# Patient Record
Sex: FEMALE | Race: WHITE | NOT HISPANIC OR LATINO | URBAN - METROPOLITAN AREA
[De-identification: names, ages, dates, MRNs, and addresses within clinical notes are randomized per-mention and may not be internally consistent; named-entity substitution may affect disease eponyms.]

---

## 2021-01-06 ENCOUNTER — OUTPATIENT (OUTPATIENT)
Dept: OUTPATIENT SERVICES | Facility: HOSPITAL | Age: 66
LOS: 1 days | Discharge: HOME | End: 2021-01-06
Payer: MEDICAID

## 2021-01-06 DIAGNOSIS — N18.32 CHRONIC KIDNEY DISEASE, STAGE 3B: ICD-10-CM

## 2021-01-06 PROCEDURE — 76775 US EXAM ABDO BACK WALL LIM: CPT | Mod: 26

## 2023-02-21 ENCOUNTER — INPATIENT (INPATIENT)
Facility: HOSPITAL | Age: 68
LOS: 2 days | Discharge: HOME | DRG: 41 | End: 2023-02-24
Attending: HOSPITALIST | Admitting: HOSPITALIST
Payer: MEDICARE

## 2023-02-21 VITALS
OXYGEN SATURATION: 96 % | HEART RATE: 98 BPM | RESPIRATION RATE: 20 BRPM | DIASTOLIC BLOOD PRESSURE: 79 MMHG | TEMPERATURE: 97 F | SYSTOLIC BLOOD PRESSURE: 169 MMHG

## 2023-02-21 DIAGNOSIS — H34.12 CENTRAL RETINAL ARTERY OCCLUSION, LEFT EYE: ICD-10-CM

## 2023-02-21 LAB
ANION GAP SERPL CALC-SCNC: 15 MMOL/L — HIGH (ref 7–14)
BASOPHILS # BLD AUTO: 0.09 K/UL — SIGNIFICANT CHANGE UP (ref 0–0.2)
BASOPHILS NFR BLD AUTO: 0.6 % — SIGNIFICANT CHANGE UP (ref 0–1)
BUN SERPL-MCNC: 32 MG/DL — HIGH (ref 10–20)
CALCIUM SERPL-MCNC: 9.9 MG/DL — SIGNIFICANT CHANGE UP (ref 8.4–10.5)
CHLORIDE SERPL-SCNC: 104 MMOL/L — SIGNIFICANT CHANGE UP (ref 98–110)
CO2 SERPL-SCNC: 20 MMOL/L — SIGNIFICANT CHANGE UP (ref 17–32)
CREAT SERPL-MCNC: 1.5 MG/DL — SIGNIFICANT CHANGE UP (ref 0.7–1.5)
CRP SERPL-MCNC: 18.5 MG/L — HIGH
EGFR: 38 ML/MIN/1.73M2 — LOW
EOSINOPHIL # BLD AUTO: 0.13 K/UL — SIGNIFICANT CHANGE UP (ref 0–0.7)
EOSINOPHIL NFR BLD AUTO: 0.8 % — SIGNIFICANT CHANGE UP (ref 0–8)
ERYTHROCYTE [SEDIMENTATION RATE] IN BLOOD: 45 MM/HR — HIGH (ref 0–20)
GLUCOSE BLDC GLUCOMTR-MCNC: 131 MG/DL — HIGH (ref 70–99)
GLUCOSE BLDC GLUCOMTR-MCNC: 131 MG/DL — HIGH (ref 70–99)
GLUCOSE SERPL-MCNC: 131 MG/DL — HIGH (ref 70–99)
HCT VFR BLD CALC: 46.4 % — SIGNIFICANT CHANGE UP (ref 37–47)
HGB BLD-MCNC: 13.8 G/DL — SIGNIFICANT CHANGE UP (ref 12–16)
IMM GRANULOCYTES NFR BLD AUTO: 0.6 % — HIGH (ref 0.1–0.3)
LYMPHOCYTES # BLD AUTO: 14.7 % — LOW (ref 20.5–51.1)
LYMPHOCYTES # BLD AUTO: 2.4 K/UL — SIGNIFICANT CHANGE UP (ref 1.2–3.4)
MCHC RBC-ENTMCNC: 23.9 PG — LOW (ref 27–31)
MCHC RBC-ENTMCNC: 29.7 G/DL — LOW (ref 32–37)
MCV RBC AUTO: 80.3 FL — LOW (ref 81–99)
MONOCYTES # BLD AUTO: 0.64 K/UL — HIGH (ref 0.1–0.6)
MONOCYTES NFR BLD AUTO: 3.9 % — SIGNIFICANT CHANGE UP (ref 1.7–9.3)
NEUTROPHILS # BLD AUTO: 12.92 K/UL — HIGH (ref 1.4–6.5)
NEUTROPHILS NFR BLD AUTO: 79.4 % — HIGH (ref 42.2–75.2)
NRBC # BLD: 0 /100 WBCS — SIGNIFICANT CHANGE UP (ref 0–0)
PLATELET # BLD AUTO: 387 K/UL — SIGNIFICANT CHANGE UP (ref 130–400)
POTASSIUM SERPL-MCNC: 4.5 MMOL/L — SIGNIFICANT CHANGE UP (ref 3.5–5)
POTASSIUM SERPL-SCNC: 4.5 MMOL/L — SIGNIFICANT CHANGE UP (ref 3.5–5)
RBC # BLD: 5.78 M/UL — HIGH (ref 4.2–5.4)
RBC # FLD: 15.9 % — HIGH (ref 11.5–14.5)
SARS-COV-2 RNA SPEC QL NAA+PROBE: SIGNIFICANT CHANGE UP
SODIUM SERPL-SCNC: 139 MMOL/L — SIGNIFICANT CHANGE UP (ref 135–146)
WBC # BLD: 16.28 K/UL — HIGH (ref 4.8–10.8)
WBC # FLD AUTO: 16.28 K/UL — HIGH (ref 4.8–10.8)

## 2023-02-21 PROCEDURE — 93312 ECHO TRANSESOPHAGEAL: CPT

## 2023-02-21 PROCEDURE — 70540 MRI ORBIT/FACE/NECK W/O DYE: CPT

## 2023-02-21 PROCEDURE — 81001 URINALYSIS AUTO W/SCOPE: CPT

## 2023-02-21 PROCEDURE — 83735 ASSAY OF MAGNESIUM: CPT

## 2023-02-21 PROCEDURE — 86803 HEPATITIS C AB TEST: CPT

## 2023-02-21 PROCEDURE — 83036 HEMOGLOBIN GLYCOSYLATED A1C: CPT

## 2023-02-21 PROCEDURE — 33285 INSJ SUBQ CAR RHYTHM MNTR: CPT

## 2023-02-21 PROCEDURE — 80053 COMPREHEN METABOLIC PANEL: CPT

## 2023-02-21 PROCEDURE — 93325 DOPPLER ECHO COLOR FLOW MAPG: CPT

## 2023-02-21 PROCEDURE — 99223 1ST HOSP IP/OBS HIGH 75: CPT

## 2023-02-21 PROCEDURE — 82962 GLUCOSE BLOOD TEST: CPT

## 2023-02-21 PROCEDURE — 85025 COMPLETE CBC W/AUTO DIFF WBC: CPT

## 2023-02-21 PROCEDURE — 70498 CT ANGIOGRAPHY NECK: CPT | Mod: 26,MA

## 2023-02-21 PROCEDURE — 93320 DOPPLER ECHO COMPLETE: CPT

## 2023-02-21 PROCEDURE — 93880 EXTRACRANIAL BILAT STUDY: CPT

## 2023-02-21 PROCEDURE — 93970 EXTREMITY STUDY: CPT

## 2023-02-21 PROCEDURE — 93010 ELECTROCARDIOGRAM REPORT: CPT

## 2023-02-21 PROCEDURE — 70496 CT ANGIOGRAPHY HEAD: CPT | Mod: 26,MA

## 2023-02-21 PROCEDURE — 93005 ELECTROCARDIOGRAM TRACING: CPT

## 2023-02-21 PROCEDURE — 93306 TTE W/DOPPLER COMPLETE: CPT

## 2023-02-21 PROCEDURE — 70551 MRI BRAIN STEM W/O DYE: CPT

## 2023-02-21 PROCEDURE — 70450 CT HEAD/BRAIN W/O DYE: CPT | Mod: 26,MA,59

## 2023-02-21 PROCEDURE — 36415 COLL VENOUS BLD VENIPUNCTURE: CPT

## 2023-02-21 PROCEDURE — C1764: CPT

## 2023-02-21 RX ORDER — EMPAGLIFLOZIN 10 MG/1
1 TABLET, FILM COATED ORAL
Qty: 0 | Refills: 0 | DISCHARGE

## 2023-02-21 RX ORDER — GABAPENTIN 400 MG/1
300 CAPSULE ORAL THREE TIMES A DAY
Refills: 0 | Status: DISCONTINUED | OUTPATIENT
Start: 2023-02-21 | End: 2023-02-24

## 2023-02-21 RX ORDER — INSULIN GLARGINE 100 [IU]/ML
38 INJECTION, SOLUTION SUBCUTANEOUS AT BEDTIME
Refills: 0 | Status: DISCONTINUED | OUTPATIENT
Start: 2023-02-21 | End: 2023-02-24

## 2023-02-21 RX ORDER — HEPARIN SODIUM 5000 [USP'U]/ML
5000 INJECTION INTRAVENOUS; SUBCUTANEOUS EVERY 12 HOURS
Refills: 0 | Status: DISCONTINUED | OUTPATIENT
Start: 2023-02-21 | End: 2023-02-24

## 2023-02-21 RX ORDER — DEXTROSE 50 % IN WATER 50 %
25 SYRINGE (ML) INTRAVENOUS ONCE
Refills: 0 | Status: DISCONTINUED | OUTPATIENT
Start: 2023-02-21 | End: 2023-02-24

## 2023-02-21 RX ORDER — SPIRONOLACTONE 25 MG/1
0.5 TABLET, FILM COATED ORAL
Qty: 0 | Refills: 0 | DISCHARGE

## 2023-02-21 RX ORDER — SODIUM CHLORIDE 9 MG/ML
1000 INJECTION, SOLUTION INTRAVENOUS
Refills: 0 | Status: DISCONTINUED | OUTPATIENT
Start: 2023-02-21 | End: 2023-02-24

## 2023-02-21 RX ORDER — GLUCAGON INJECTION, SOLUTION 0.5 MG/.1ML
1 INJECTION, SOLUTION SUBCUTANEOUS ONCE
Refills: 0 | Status: DISCONTINUED | OUTPATIENT
Start: 2023-02-21 | End: 2023-02-24

## 2023-02-21 RX ORDER — LOSARTAN POTASSIUM 100 MG/1
1 TABLET, FILM COATED ORAL
Qty: 0 | Refills: 0 | DISCHARGE

## 2023-02-21 RX ORDER — CLOPIDOGREL BISULFATE 75 MG/1
75 TABLET, FILM COATED ORAL ONCE
Refills: 0 | Status: COMPLETED | OUTPATIENT
Start: 2023-02-21 | End: 2023-02-21

## 2023-02-21 RX ORDER — ASPIRIN/CALCIUM CARB/MAGNESIUM 324 MG
81 TABLET ORAL DAILY
Refills: 0 | Status: DISCONTINUED | OUTPATIENT
Start: 2023-02-21 | End: 2023-02-24

## 2023-02-21 RX ORDER — SPIRONOLACTONE 25 MG/1
12.5 TABLET, FILM COATED ORAL DAILY
Refills: 0 | Status: DISCONTINUED | OUTPATIENT
Start: 2023-02-21 | End: 2023-02-24

## 2023-02-21 RX ORDER — LOSARTAN POTASSIUM 100 MG/1
50 TABLET, FILM COATED ORAL DAILY
Refills: 0 | Status: DISCONTINUED | OUTPATIENT
Start: 2023-02-21 | End: 2023-02-24

## 2023-02-21 RX ORDER — AMLODIPINE BESYLATE 2.5 MG/1
5 TABLET ORAL DAILY
Refills: 0 | Status: DISCONTINUED | OUTPATIENT
Start: 2023-02-21 | End: 2023-02-24

## 2023-02-21 RX ORDER — DEXTROSE 50 % IN WATER 50 %
15 SYRINGE (ML) INTRAVENOUS ONCE
Refills: 0 | Status: DISCONTINUED | OUTPATIENT
Start: 2023-02-21 | End: 2023-02-24

## 2023-02-21 RX ORDER — INSULIN LISPRO 100/ML
VIAL (ML) SUBCUTANEOUS
Refills: 0 | Status: DISCONTINUED | OUTPATIENT
Start: 2023-02-21 | End: 2023-02-24

## 2023-02-21 RX ORDER — ATORVASTATIN CALCIUM 80 MG/1
20 TABLET, FILM COATED ORAL AT BEDTIME
Refills: 0 | Status: DISCONTINUED | OUTPATIENT
Start: 2023-02-21 | End: 2023-02-24

## 2023-02-21 RX ORDER — AMLODIPINE BESYLATE 2.5 MG/1
1 TABLET ORAL
Qty: 0 | Refills: 0 | DISCHARGE

## 2023-02-21 RX ORDER — LINAGLIPTIN 5 MG/1
1 TABLET, FILM COATED ORAL
Qty: 0 | Refills: 0 | DISCHARGE

## 2023-02-21 RX ORDER — INSULIN LISPRO 100/ML
8 VIAL (ML) SUBCUTANEOUS
Refills: 0 | Status: DISCONTINUED | OUTPATIENT
Start: 2023-02-21 | End: 2023-02-24

## 2023-02-21 RX ORDER — DEXTROSE 50 % IN WATER 50 %
12.5 SYRINGE (ML) INTRAVENOUS ONCE
Refills: 0 | Status: DISCONTINUED | OUTPATIENT
Start: 2023-02-21 | End: 2023-02-24

## 2023-02-21 RX ORDER — GABAPENTIN 400 MG/1
1 CAPSULE ORAL
Qty: 0 | Refills: 0 | DISCHARGE

## 2023-02-21 RX ADMIN — GABAPENTIN 300 MILLIGRAM(S): 400 CAPSULE ORAL at 21:34

## 2023-02-21 RX ADMIN — INSULIN GLARGINE 38 UNIT(S): 100 INJECTION, SOLUTION SUBCUTANEOUS at 21:34

## 2023-02-21 RX ADMIN — CLOPIDOGREL BISULFATE 75 MILLIGRAM(S): 75 TABLET, FILM COATED ORAL at 16:39

## 2023-02-21 RX ADMIN — ATORVASTATIN CALCIUM 20 MILLIGRAM(S): 80 TABLET, FILM COATED ORAL at 21:34

## 2023-02-21 NOTE — ED PROVIDER NOTE - ATTENDING APP SHARED VISIT CONTRIBUTION OF CARE
68-year-old woman, history of hypertension, diabetes, hyperlipidemia, chronic kidney disease, sent in by her ophthalmologist for evaluation after she presented to his office with 6 days of intermittent left eye blurriness.  She reports that the left eye has been intermittently "easy" but as she has a baseline of diabetic retinopathy and macular edema, she did not think much of it.  She made an appointment with her doctor this morning, who dilated her eyes and is suspicious for left-sided central retinal artery occlusion.  Patient denies headache, nausea, vomiting, or other focal symptoms.  On exam she is alert and nontoxic appearing.  Attempted for endoscopy, however, could not clearly delineate retinal pallor.  We will begin stroke work-up, consult neuro, reassess.

## 2023-02-21 NOTE — ED PROVIDER NOTE - OBJECTIVE STATEMENT
Pt with DM, HTN, HLD, CKD presents with left eye intermittent blurred vision x 4 days. Seen by her eye doc today and sent to ED for concern of retinal artery occlusion. Denies vision loss or pain. just blurred. Denies weakness, numbness, HA, slurred speech.

## 2023-02-21 NOTE — ED PROVIDER NOTE - PHYSICAL EXAMINATION
CONST: Well appearing in NAD  EYES: pupils dilated. EOMI. VA with correction is 20/20 OD and 20/200 OS. Corneas clear  ENT: Oropharynx normal appearing, no erythema or exudates. Uvula midline.  NECK: Non-tender, no meningeal signs  CARD: Normal S1 S2; Normal rate and rhythm  RESP: Equal BS B/L, No wheezes, rhonchi or rales. No distress  MS: Normal ROM in all extremities. No midline spinal tenderness.  SKIN: Warm, dry, no acute rashes. Good turgor  NEURO: A&Ox3, No focal deficits. Strength 5/5 with no sensory deficits. Steady gait

## 2023-02-21 NOTE — ED ADULT NURSE NOTE - OBJECTIVE STATEMENT
pt presents to the ED from ophthalmologist for L eye eval. pt states she lost vision in her left eye on sat. pt denies n/v, dizziness

## 2023-02-21 NOTE — ED PROVIDER NOTE - CLINICAL SUMMARY MEDICAL DECISION MAKING FREE TEXT BOX
Labs ok. CT head with chronic lacunes, no acute infarct noted, CTA head/neck with scattered atherosclerotic disease but no significant narrowing of vessels. Spoke with neuro, as pt still symptomatic, not a candidate for CDU, will admit medicine for completion of stroke workup.

## 2023-02-21 NOTE — PATIENT PROFILE ADULT - FALL HARM RISK - RISK INTERVENTIONS

## 2023-02-21 NOTE — H&P ADULT - HISTORY OF PRESENT ILLNESS
Pt is a 67 y/o F with DM, HTN, HLD, CKD, chronic diabetic retinopathy of both eyes, who presents with left eye blurred vision x 5 days. Last thursday, pt was home and in the evening she had acute onset blurred vision in her left eye, she decided to go to sleep. The next morning when she woke up the blurred vision was gone but a few hours later it resumed and has stayed blurry ever since. The blurriness is at its worse in the middle/central field of vision, she can make out outlines but cannot read or really tell colors. The right eye is not affected. There is NO pain whatsoever. Pt went to her opthomatologist this morning, he did an exam and was concerned for central retinal artery occlusion so he sent her to the ED for imaging. Denies weakness, numbness, HA, slurred speech.    In the ED, pt given a dose of plavix 75  Vitals: Vital Signs Last 24 Hrs  T(C): 36.1 (21 Feb 2023 13:29), Max: 36.1 (21 Feb 2023 11:48)  T(F): 97 (21 Feb 2023 13:29), Max: 97 (21 Feb 2023 11:48)  HR: 95 (21 Feb 2023 13:29) (95 - 98)  BP: 142/72 (21 Feb 2023 13:29) (142/72 - 169/79)  BP(mean): --  RR: 20 (21 Feb 2023 13:29) (20 - 20)  SpO2: 96% (21 Feb 2023 13:29) (96% - 96%)    Parameters below as of 21 Feb 2023 13:29  Patient On (Oxygen Delivery Method): room air    Labs: remarkable for wbc 16k, crp 18.5  Imaging:  < from: CT Angio Head w/ IV Cont (02.21.23 @ 15:04) >  IMPRESSION:    1.  No evidence of major vascular stenosis or occlusion.    2.  Scattered mild atheromatous changes as above.    < end of copied text >  < from: CT Head No Cont (02.21.23 @ 15:04) >  IMPRESSION:    1.  No evidence of acute intracranial hemorrhage or large territory   infarct.    2.  Lacunar infarcts in the right basal ganglia and corona radiata of   indeterminate age.    < end of copied text >

## 2023-02-21 NOTE — H&P ADULT - ASSESSMENT
Pt is a 67 y/o F with DM, HTN, HLD, CKD, chronic diabetic retinopathy of both eyes, who presents with left eye blurred vision x 5 days.    #Suspect Left central artery occlusion  -continuous PAINLESS blurry vision in left eye for 5 days  -at this point pt is out of timeline of any potential emergent intervention, though data and studies are anyways unclear about acute treatment for CRAO  -CTH: no acute path, indeterminate lacunar infarcts  -CTA H+N: No evidence of major vascular stenosis or occlusion.Scattered mild atheromatous changes as above.  -neuro eval  -Optho eval  -ECG  -TTE    #Leukocytosis  -wbc 16k, no other sirs   -CRP 18.5  -ESR pending  -soemthing to consider in monocular vision loss is GCA which may explain wbc and crp though pt has absolutely no pain both in eye or temporal area  -hold abx for now and monitor. Can consider steroids/rheum eval    #HTN  #HLD  -c/w losartan 50  -c/w amlodipine 5  -c/w spirinolaxone 12.5  -c/w lipitor 20    #DM   #diabetic neuropathy  -takes tradjenta, novolog, basaglar, jardiance at home  -c/w home dose lantus 38 units, admelog 8u TID, ISS  -monitor and adjust   -c/w gabapentin 300 tid    #Anxiety  -c/w buspirone 7.5    DVT ppx: heparin  Diet: dash  Dispo: from home    Pt is a 69 y/o F with DM, HTN, HLD, CKD, chronic diabetic retinopathy of both eyes, who presents with left eye blurred vision x 5 days.    #Suspect Left central retinal artery occlusion  -continuous PAINLESS blurry vision in left eye for 5 days  -at this point pt is out of timeline of any potential emergent intervention, though data and studies are anyways unclear about acute treatment for CRAO  -CTH: no acute path, indeterminate lacunar infarcts  -CTA H+N: No evidence of major vascular stenosis or occlusion.Scattered mild atheromatous changes as above.  -neuro eval  -Optho eval  -ECG  -TTE    #Leukocytosis  -wbc 16k, no other sirs   -CRP 18.5  -ESR pending  -soemthing to consider in monocular vision loss is GCA which may explain wbc and crp though pt has absolutely no pain both in eye or temporal area  -hold abx for now and monitor. Can consider steroids/rheum eval    #HTN  #HLD  -c/w losartan 50  -c/w amlodipine 5  -c/w spirinolaxone 12.5  -c/w lipitor 20    #DM   #diabetic neuropathy  -takes tradjenta, novolog, basaglar, jardiance at home  -c/w home dose lantus 38 units, admelog 8u TID, ISS  -monitor and adjust   -c/w gabapentin 300 tid    #Anxiety  -c/w buspirone 7.5    DVT ppx: heparin  Diet: dash  Dispo: from home    Pt is a 67 y/o F with DM, HTN, HLD, CKD, chronic diabetic retinopathy of both eyes, who presents with left eye blurred vision x 5 days.    #Suspect Left central retinal artery occlusion  -continuous PAINLESS blurry vision in left eye for 5 days  -Saw her ophthalmologist this morning and he was concerned for CRAO based on his eye exam. He did not do any intervention and sent her to ed for stroke w/u   -at this point pt is out of timeline of any potential emergent intervention, though data and studies are anyways unclear about acute treatment for CRAO  -CTH: no acute path, indeterminate lacunar infarcts  -CTA H+N: No evidence of major vascular stenosis or occlusion.Scattered mild atheromatous changes as above.  -neuro eval  -Optho eval  -ECG  -TTE    #Leukocytosis  -wbc 16k, no other sirs   -CRP 18.5  -ESR pending  -something to consider in monocular vision loss is GCA which may explain wbc and crp though pt has absolutely no pain both in eye or temporal area, no headaches, jaw claudication, fever/sick symptoms, or signs of vacular abnormalities. Therefore, pt does not meet criteria for steroids at this time.   -hold abx for now and monitor. Can consider steroids/rheum eval    #HTN  #HLD  -c/w losartan 50  -c/w amlodipine 5  -c/w spirinolaxone 12.5  -c/w lipitor 20    #DM   #diabetic neuropathy  -takes tradjenta, novolog, basaglar, jardiance at home  -c/w home dose lantus 38 units, admelog 8u TID, ISS  -monitor and adjust   -c/w gabapentin 300 tid    #Anxiety  -c/w buspirone 7.5    DVT ppx: heparin  Diet: dash  Dispo: from home

## 2023-02-21 NOTE — ED PROVIDER NOTE - NS ED ATTENDING STATEMENT MOD
This was a shared visit with the LINN. I reviewed and verified the documentation and independently performed the documented:

## 2023-02-21 NOTE — ED ADULT NURSE NOTE - NS ED NURSE RECORD ANOTHER VITAL SIGN
Yes GOAL: Pt will improve b/l  (UE/LE) strength by at least 1/2 MMT grade within 2-3 weeks to assist with performing functional mobility and ADLs. GOAL: Pt will improve b/l  (UE/LE) strength by at least 1/2 MMT grade within 1-3 days to assist with performing functional mobility and ADLs.

## 2023-02-21 NOTE — H&P ADULT - ATTENDING COMMENTS
67 YO F w/ a PMH of DM2, HTN, HLD, CKD3, and chronic diabetic retinopathy of both eyes who presents to the hospital w/ a c/o left-sided vision loss that occurred suddenly x 5 days ago. Described as sudden painless, and turned into left-sided blurry vision over the next several days. Denies any pain, temporal pain, or fevers/chills. No palpitations or CP. ROS is negative except as above.     In the ED, CTH/CTA-head/neck showed old lacunar infarcts. Neuro consulted and recommended MRI, Echo, ASA/statin, and plavix.     FMHx:   -No family Hx of early cardiac death, CAD, asthma, or genetic disorders identified    Physical exam shows pt in NAD. VSS, afebrile, not hypoxic on RA. A&Ox3. Neuro exam without deficits, motor/sensory intact, no dysarthria, no facial asymmetry. Muscle strength/sensation intact. CTA B/L with no W/C/R. RRR, no M/G/R. ABD is soft and non-tender, normoactive BSs. LEs without swelling. No rashes. Labs and radiology as above.     Left-sided painless vision loss, suspect CRAO vs CVA. Neurology is following case. A1c and lipids. TSH, B12, and Folate. MRI. Echo. Secondary stroke PPX (ASA/Statin). Neurochecks. PT/OT/speech consult. Fall precautions.   -Optho  -Agree with ESR/CRP to rule out Temporal arteritis    Hypertensive urgency. Restart home meds. Monitor VSs.     Hx of DM2, HLD, CKD3, and chronic diabetic retinopathy of both eyes. Restart home meds, except as stated above. DVT PPX. Inform PCP of pt's admission to hospital. My note supersedes the residents note.     Date seen by Attendin23

## 2023-02-21 NOTE — H&P ADULT - NSHPPHYSICALEXAM_GEN_ALL_CORE
GENERAL: NAD, lying in bed comfortably  HEAD:  Atraumatic, Normocephalic  EYES: left eye blurry vision, cannot read or tell colors, EOMI, r eye with full baseline vision  ENT: Moist mucous membranes  CHEST/LUNG: Clear to auscultation bilaterally; No rales, rhonchi, wheezing, or rubs. Unlabored respirations  HEART: Regular rate and rhythm; No murmurs, rubs, or gallops  ABDOMEN: Soft, nontender, nondistended  EXTREMITIES:  No clubbing, cyanosis, or edema  NERVOUS SYSTEM:  A&Ox3

## 2023-02-21 NOTE — H&P ADULT - NSHPLABSRESULTS_GEN_ALL_CORE
13.8   16.28 )-----------( 387      ( 21 Feb 2023 14:42 )             46.4       02-21    139  |  104  |  32<H>  ----------------------------<  131<H>  4.5   |  20  |  1.5    Ca    9.9      21 Feb 2023 14:42

## 2023-02-22 LAB
A1C WITH ESTIMATED AVERAGE GLUCOSE RESULT: 7.1 % — HIGH (ref 4–5.6)
ALBUMIN SERPL ELPH-MCNC: 3.9 G/DL — SIGNIFICANT CHANGE UP (ref 3.5–5.2)
ALP SERPL-CCNC: 108 U/L — SIGNIFICANT CHANGE UP (ref 30–115)
ALT FLD-CCNC: 11 U/L — SIGNIFICANT CHANGE UP (ref 0–41)
ANION GAP SERPL CALC-SCNC: 13 MMOL/L — SIGNIFICANT CHANGE UP (ref 7–14)
AST SERPL-CCNC: 13 U/L — SIGNIFICANT CHANGE UP (ref 0–41)
BASOPHILS # BLD AUTO: 0.08 K/UL — SIGNIFICANT CHANGE UP (ref 0–0.2)
BASOPHILS NFR BLD AUTO: 0.6 % — SIGNIFICANT CHANGE UP (ref 0–1)
BILIRUB SERPL-MCNC: 0.4 MG/DL — SIGNIFICANT CHANGE UP (ref 0.2–1.2)
BUN SERPL-MCNC: 31 MG/DL — HIGH (ref 10–20)
CALCIUM SERPL-MCNC: 9.6 MG/DL — SIGNIFICANT CHANGE UP (ref 8.4–10.5)
CHLORIDE SERPL-SCNC: 104 MMOL/L — SIGNIFICANT CHANGE UP (ref 98–110)
CO2 SERPL-SCNC: 22 MMOL/L — SIGNIFICANT CHANGE UP (ref 17–32)
CREAT SERPL-MCNC: 1.4 MG/DL — SIGNIFICANT CHANGE UP (ref 0.7–1.5)
EGFR: 41 ML/MIN/1.73M2 — LOW
EOSINOPHIL # BLD AUTO: 0.23 K/UL — SIGNIFICANT CHANGE UP (ref 0–0.7)
EOSINOPHIL NFR BLD AUTO: 1.7 % — SIGNIFICANT CHANGE UP (ref 0–8)
ESTIMATED AVERAGE GLUCOSE: 157 MG/DL — HIGH (ref 68–114)
GLUCOSE BLDC GLUCOMTR-MCNC: 109 MG/DL — HIGH (ref 70–99)
GLUCOSE BLDC GLUCOMTR-MCNC: 129 MG/DL — HIGH (ref 70–99)
GLUCOSE BLDC GLUCOMTR-MCNC: 133 MG/DL — HIGH (ref 70–99)
GLUCOSE BLDC GLUCOMTR-MCNC: 152 MG/DL — HIGH (ref 70–99)
GLUCOSE SERPL-MCNC: 143 MG/DL — HIGH (ref 70–99)
HCT VFR BLD CALC: 40.2 % — SIGNIFICANT CHANGE UP (ref 37–47)
HCV AB S/CO SERPL IA: 0.04 COI — SIGNIFICANT CHANGE UP
HCV AB SERPL-IMP: SIGNIFICANT CHANGE UP
HGB BLD-MCNC: 12.8 G/DL — SIGNIFICANT CHANGE UP (ref 12–16)
IMM GRANULOCYTES NFR BLD AUTO: 0.4 % — HIGH (ref 0.1–0.3)
LYMPHOCYTES # BLD AUTO: 1.96 K/UL — SIGNIFICANT CHANGE UP (ref 1.2–3.4)
LYMPHOCYTES # BLD AUTO: 14.5 % — LOW (ref 20.5–51.1)
MAGNESIUM SERPL-MCNC: 1.9 MG/DL — SIGNIFICANT CHANGE UP (ref 1.8–2.4)
MCHC RBC-ENTMCNC: 25.2 PG — LOW (ref 27–31)
MCHC RBC-ENTMCNC: 31.8 G/DL — LOW (ref 32–37)
MCV RBC AUTO: 79.3 FL — LOW (ref 81–99)
MONOCYTES # BLD AUTO: 0.64 K/UL — HIGH (ref 0.1–0.6)
MONOCYTES NFR BLD AUTO: 4.7 % — SIGNIFICANT CHANGE UP (ref 1.7–9.3)
NEUTROPHILS # BLD AUTO: 10.56 K/UL — HIGH (ref 1.4–6.5)
NEUTROPHILS NFR BLD AUTO: 78.1 % — HIGH (ref 42.2–75.2)
NRBC # BLD: 0 /100 WBCS — SIGNIFICANT CHANGE UP (ref 0–0)
PLATELET # BLD AUTO: 309 K/UL — SIGNIFICANT CHANGE UP (ref 130–400)
POTASSIUM SERPL-MCNC: 4.6 MMOL/L — SIGNIFICANT CHANGE UP (ref 3.5–5)
POTASSIUM SERPL-SCNC: 4.6 MMOL/L — SIGNIFICANT CHANGE UP (ref 3.5–5)
PROT SERPL-MCNC: 7 G/DL — SIGNIFICANT CHANGE UP (ref 6–8)
RBC # BLD: 5.07 M/UL — SIGNIFICANT CHANGE UP (ref 4.2–5.4)
RBC # FLD: 15.9 % — HIGH (ref 11.5–14.5)
SODIUM SERPL-SCNC: 139 MMOL/L — SIGNIFICANT CHANGE UP (ref 135–146)
WBC # BLD: 13.53 K/UL — HIGH (ref 4.8–10.8)
WBC # FLD AUTO: 13.53 K/UL — HIGH (ref 4.8–10.8)

## 2023-02-22 PROCEDURE — 93880 EXTRACRANIAL BILAT STUDY: CPT | Mod: 26

## 2023-02-22 PROCEDURE — 99232 SBSQ HOSP IP/OBS MODERATE 35: CPT

## 2023-02-22 PROCEDURE — 93306 TTE W/DOPPLER COMPLETE: CPT | Mod: 26

## 2023-02-22 PROCEDURE — 99221 1ST HOSP IP/OBS SF/LOW 40: CPT

## 2023-02-22 PROCEDURE — 70540 MRI ORBIT/FACE/NECK W/O DYE: CPT | Mod: 26

## 2023-02-22 PROCEDURE — 70551 MRI BRAIN STEM W/O DYE: CPT | Mod: 26

## 2023-02-22 RX ORDER — CLOPIDOGREL BISULFATE 75 MG/1
75 TABLET, FILM COATED ORAL DAILY
Refills: 0 | Status: DISCONTINUED | OUTPATIENT
Start: 2023-02-22 | End: 2023-02-24

## 2023-02-22 RX ADMIN — GABAPENTIN 300 MILLIGRAM(S): 400 CAPSULE ORAL at 05:33

## 2023-02-22 RX ADMIN — AMLODIPINE BESYLATE 5 MILLIGRAM(S): 2.5 TABLET ORAL at 05:33

## 2023-02-22 RX ADMIN — ATORVASTATIN CALCIUM 20 MILLIGRAM(S): 80 TABLET, FILM COATED ORAL at 21:47

## 2023-02-22 RX ADMIN — INSULIN GLARGINE 38 UNIT(S): 100 INJECTION, SOLUTION SUBCUTANEOUS at 21:46

## 2023-02-22 RX ADMIN — Medication 8 UNIT(S): at 08:19

## 2023-02-22 RX ADMIN — Medication 8 UNIT(S): at 11:46

## 2023-02-22 RX ADMIN — Medication 7.5 MILLIGRAM(S): at 17:08

## 2023-02-22 RX ADMIN — Medication 8 UNIT(S): at 17:09

## 2023-02-22 RX ADMIN — LOSARTAN POTASSIUM 50 MILLIGRAM(S): 100 TABLET, FILM COATED ORAL at 05:33

## 2023-02-22 RX ADMIN — Medication 81 MILLIGRAM(S): at 11:46

## 2023-02-22 RX ADMIN — HEPARIN SODIUM 5000 UNIT(S): 5000 INJECTION INTRAVENOUS; SUBCUTANEOUS at 17:08

## 2023-02-22 RX ADMIN — HEPARIN SODIUM 5000 UNIT(S): 5000 INJECTION INTRAVENOUS; SUBCUTANEOUS at 05:34

## 2023-02-22 RX ADMIN — SPIRONOLACTONE 12.5 MILLIGRAM(S): 25 TABLET, FILM COATED ORAL at 05:34

## 2023-02-22 RX ADMIN — Medication 7.5 MILLIGRAM(S): at 05:34

## 2023-02-22 RX ADMIN — GABAPENTIN 300 MILLIGRAM(S): 400 CAPSULE ORAL at 21:46

## 2023-02-22 RX ADMIN — Medication 1: at 11:47

## 2023-02-22 RX ADMIN — GABAPENTIN 300 MILLIGRAM(S): 400 CAPSULE ORAL at 13:51

## 2023-02-22 NOTE — PROGRESS NOTE ADULT - ASSESSMENT
67 YO F w/ a PMH of DM2, HTN, HLD, CKD3, and chronic diabetic retinopathy of both eyes who presents to the hospital w/ a c/o left-sided vision loss that occurred suddenly x 5 days ago. Described as sudden painless, and turned into left-sided blurry vision over the next several days. Denies any pain, temporal pain, or fevers/chills. No palpitations or CP. ROS is negative except as above.       #LEFT EYE BLURRY VISION  CENTRAL RETINAL ARTERY OCCLUSION?  -continuous PAINLESS blurry vision in left eye for 5 days  -Saw her ophthalmologist this morning and he was concerned for CRAO based on his eye exam. He did not do any intervention and sent her to ed for stroke w/u   -at this point pt is out of timeline of any potential emergent intervention, though data and studies are anyways unclear about acute treatment for CRAO  -CTH: no acute path, indeterminate lacunar infarcts  -CTA H+N: No evidence of major vascular stenosis or occlusion.Scattered mild atheromatous changes as above.  -neuro eval, following added plavix as per neuro recommendations   -Optho eval, recomended carotid , echo   -ECG  -TTE    #Leukocytosis  -wbc 16k, no other sirs   -CRP 18.5  -ESR pending  -something to consider in monocular vision loss is GCA which may explain wbc and crp though pt has absolutely no pain both in eye or temporal area, no headaches, jaw claudication, fever/sick symptoms, or signs of vacular abnormalities. Therefore, pt does not meet criteria for steroids at this time.   -hold abx for now and monitor.   monitor for any fever spikes    #HTN  #HLD  -c/w losartan 50  -c/w amlodipine 5  -c/w spirinolaxone 12.5  -c/w lipitor 20    #DM   #diabetic neuropathy  -takes tradjenta, novolog, basaglar, jardiance at home  -c/w home dose lantus 38 units, admelog 8u TID, ISS  -monitor and adjust   -c/w gabapentin 300 tid    #Anxiety  -c/w buspirone 7.5    DVT ppx: heparin  Diet: dash  Dispo: from home     follow up: MR, echo, duplex carotid, neurology and ophthalmology. monitor for nay fever spikes   family at bedside discussed in length with patient.    Follow up retina specialist Dr. Zhen Martinez upon discharge

## 2023-02-22 NOTE — PROGRESS NOTE ADULT - SUBJECTIVE AND OBJECTIVE BOX
MERLIN PORTER  68y, Female  Allergy: No Known Allergies    Hospital Day: 1d    Patient seen and examined earlier today.     PMH/PSH:  PAST MEDICAL & SURGICAL HISTORY:      LAST 24-Hr EVENTS:    VITALS:  T(F): 97 (02-22-23 @ 14:19), Max: 97.7 (02-21-23 @ 22:15)  HR: 85 (02-22-23 @ 14:19)  BP: 122/59 (02-22-23 @ 14:19) (122/59 - 135/70)  RR: 19 (02-22-23 @ 14:19)  SpO2: --          TESTS & MEASUREMENTS:  Weight/BMI  111.13 (02-21-23 @ 22:15)  38.4 (02-21-23 @ 22:15)                          12.8   13.53 )-----------( 309      ( 22 Feb 2023 07:15 )             40.2         02-22    139  |  104  |  31<H>  ----------------------------<  143<H>  4.6   |  22  |  1.4    Ca    9.6      22 Feb 2023 07:15  Mg     1.9     02-22    TPro  7.0  /  Alb  3.9  /  TBili  0.4  /  DBili  x   /  AST  13  /  ALT  11  /  AlkPhos  108  02-22    LIVER FUNCTIONS - ( 22 Feb 2023 07:15 )  Alb: 3.9 g/dL / Pro: 7.0 g/dL / ALK PHOS: 108 U/L / ALT: 11 U/L / AST: 13 U/L / GGT: x                           COVID-19 PCR: NotDetec (02-21-23 @ 14:49)        A1C with Estimated Average Glucose Result: 7.1 % (02-22-23 @ 07:15)          RADIOLOGY, ECG, & ADDITIONAL TESTS:  12 Lead ECG:   Ventricular Rate 85 BPM    Atrial Rate 85 BPM    P-R Interval 212 ms    QRS Duration 78 ms    Q-T Interval 366 ms    QTC Calculation(Bazett) 435 ms    P Axis 41 degrees    R Axis 40 degrees    T Axis 49 degrees    Diagnosis Line Sinus rhythm with 1st degree A-V block  Otherwise normal ECG    Confirmed by DONG WILSON MD ([id number]) on 2/22/2023 7:03:55 AM (02-21-23 @ 16:51)    CT Head No Cont:   ACC: [id number] EXAM:  CT BRAIN   ORDERED BY: DELILAH HERMOSILLO     PROCEDURE DATE:  02/21/2023          INTERPRETATION:  Clinical History / Reason for exam: Left eye blurred   vision    Technique: Noncontrast head CT.  Contiguous unenhanced CT axialimages of   the head from the base to the vertex with coronal and sagittal reformats.    Comparison: None available    Findings:    The ventricles and cortical sulci are normal in size and configuration.    There are lacunar infarcts in the right corona radiata and basal ganglia   of indeterminate age.    There is no acute intracranial hemorrhage, extra-axial fluid collection   or midline shift.  Gray-white matter differentiation is maintained.    Vascular calcifications are noted.    The visualized paranasal sinuses and mastoids are clear.    IMPRESSION:    1.  No evidence of acute intracranial hemorrhage or large territory   infarct.    2.  Lacunar infarcts in the right basal ganglia and corona radiata of   indeterminate age.    --- End of Report ---            KATHERINE WILLIAMSON MD; Attending Radiologist  This document has been electronically signed. Feb 21 2023  3:17PM (02-21-23 @ 15:04)    RECENT DIAGNOSTIC ORDERS:  VA Duplex Carotid, Bilat: 15:19  Exam in Progress (02-22-23 @ 15:21)  Magnesium, Serum: AM Sched. Collection: 23-Feb-2023 04:30 (02-22-23 @ 11:17)  Comprehensive Metabolic Panel: AM Sched. Collection: 23-Feb-2023 04:30 (02-22-23 @ 11:17)  Complete Blood Count + Automated Diff: AM Sched. Collection: 23-Feb-2023 04:30 (02-22-23 @ 11:17)  MR Head No Cont: Routine   Indication: acute vision loss  Transport: Wheelchair (02-22-23 @ 11:17)  MR Orbit, Face, and/or Neck No Cont: Routine   Indication: acute vision loss  Transport: Wheelchair (02-22-23 @ 11:17)  TTE Echo Complete w/o Contrast w/ Doppler:   Transport: Wheelchair  Monitor: w/o Monitor (02-21-23 @ 18:31)  Diet, DASH/TLC:   Sodium & Cholesterol Restricted (02-21-23 @ 18:31)      MEDICATIONS:  MEDICATIONS  (STANDING):  amLODIPine   Tablet 5 milliGRAM(s) Oral daily  aspirin  chewable 81 milliGRAM(s) Oral daily  atorvastatin 20 milliGRAM(s) Oral at bedtime  busPIRone 7.5 milliGRAM(s) Oral two times a day  dextrose 5%. 1000 milliLiter(s) (50 mL/Hr) IV Continuous <Continuous>  dextrose 5%. 1000 milliLiter(s) (100 mL/Hr) IV Continuous <Continuous>  dextrose 50% Injectable 25 Gram(s) IV Push once  dextrose 50% Injectable 12.5 Gram(s) IV Push once  dextrose 50% Injectable 25 Gram(s) IV Push once  gabapentin 300 milliGRAM(s) Oral three times a day  glucagon  Injectable 1 milliGRAM(s) IntraMuscular once  heparin   Injectable 5000 Unit(s) SubCutaneous every 12 hours  insulin glargine Injectable (LANTUS) 38 Unit(s) SubCutaneous at bedtime  insulin lispro (ADMELOG) corrective regimen sliding scale   SubCutaneous three times a day before meals  insulin lispro Injectable (ADMELOG) 8 Unit(s) SubCutaneous three times a day before meals  losartan 50 milliGRAM(s) Oral daily  spironolactone 12.5 milliGRAM(s) Oral daily    MEDICATIONS  (PRN):  dextrose Oral Gel 15 Gram(s) Oral once PRN Blood Glucose LESS THAN 70 milliGRAM(s)/deciliter      HOME MEDICATIONS:  Admelog 100 units/mL injectable solution (02-21)  amLODIPine 5 mg oral tablet (02-21)  aspirin 81 mg oral tablet, chewable (02-21)  atorvastatin 20 mg oral tablet (02-21)  busPIRone 7.5 mg oral tablet (02-21)  gabapentin 300 mg oral capsule (02-21)  Jardiance 10 mg oral tablet (02-21)  losartan 50 mg oral tablet (02-21)  NovoLOG 100 units/mL injectable solution (02-21)  spironolactone 25 mg oral tablet (02-21)  Tradjenta 5 mg oral tablet (02-21)      PHYSICAL EXAM:  GENERAL: Patient lying on bed, comfoprtable   CHEST/LUNG: NVB, no wehezing   HEART: R1+R2, RRR  ABDOMEN: Soft. non tender, BS positive  EXTREMITIES:  no edema, Bruising  CNS: AAAx4. No cranial nerves deficit.

## 2023-02-23 LAB
ALBUMIN SERPL ELPH-MCNC: 3.9 G/DL — SIGNIFICANT CHANGE UP (ref 3.5–5.2)
ALP SERPL-CCNC: 101 U/L — SIGNIFICANT CHANGE UP (ref 30–115)
ALT FLD-CCNC: 10 U/L — SIGNIFICANT CHANGE UP (ref 0–41)
ANION GAP SERPL CALC-SCNC: 14 MMOL/L — SIGNIFICANT CHANGE UP (ref 7–14)
APPEARANCE UR: CLEAR — SIGNIFICANT CHANGE UP
AST SERPL-CCNC: 12 U/L — SIGNIFICANT CHANGE UP (ref 0–41)
BACTERIA # UR AUTO: NEGATIVE — SIGNIFICANT CHANGE UP
BASOPHILS # BLD AUTO: 0.06 K/UL — SIGNIFICANT CHANGE UP (ref 0–0.2)
BASOPHILS NFR BLD AUTO: 0.5 % — SIGNIFICANT CHANGE UP (ref 0–1)
BILIRUB SERPL-MCNC: 0.5 MG/DL — SIGNIFICANT CHANGE UP (ref 0.2–1.2)
BILIRUB UR-MCNC: NEGATIVE — SIGNIFICANT CHANGE UP
BUN SERPL-MCNC: 33 MG/DL — HIGH (ref 10–20)
CALCIUM SERPL-MCNC: 9.8 MG/DL — SIGNIFICANT CHANGE UP (ref 8.4–10.5)
CHLORIDE SERPL-SCNC: 105 MMOL/L — SIGNIFICANT CHANGE UP (ref 98–110)
CO2 SERPL-SCNC: 20 MMOL/L — SIGNIFICANT CHANGE UP (ref 17–32)
COLOR SPEC: SIGNIFICANT CHANGE UP
CREAT SERPL-MCNC: 1.5 MG/DL — SIGNIFICANT CHANGE UP (ref 0.7–1.5)
DIFF PNL FLD: NEGATIVE — SIGNIFICANT CHANGE UP
EGFR: 38 ML/MIN/1.73M2 — LOW
EOSINOPHIL # BLD AUTO: 0.27 K/UL — SIGNIFICANT CHANGE UP (ref 0–0.7)
EOSINOPHIL NFR BLD AUTO: 2.1 % — SIGNIFICANT CHANGE UP (ref 0–8)
EPI CELLS # UR: 1 /HPF — SIGNIFICANT CHANGE UP (ref 0–5)
GLUCOSE BLDC GLUCOMTR-MCNC: 114 MG/DL — HIGH (ref 70–99)
GLUCOSE BLDC GLUCOMTR-MCNC: 123 MG/DL — HIGH (ref 70–99)
GLUCOSE BLDC GLUCOMTR-MCNC: 126 MG/DL — HIGH (ref 70–99)
GLUCOSE BLDC GLUCOMTR-MCNC: 169 MG/DL — HIGH (ref 70–99)
GLUCOSE SERPL-MCNC: 119 MG/DL — HIGH (ref 70–99)
GLUCOSE UR QL: ABNORMAL
HCT VFR BLD CALC: 37.4 % — SIGNIFICANT CHANGE UP (ref 37–47)
HGB BLD-MCNC: 11.9 G/DL — LOW (ref 12–16)
HYALINE CASTS # UR AUTO: 1 /LPF — SIGNIFICANT CHANGE UP (ref 0–7)
IMM GRANULOCYTES NFR BLD AUTO: 0.6 % — HIGH (ref 0.1–0.3)
KETONES UR-MCNC: NEGATIVE — SIGNIFICANT CHANGE UP
LEUKOCYTE ESTERASE UR-ACNC: ABNORMAL
LYMPHOCYTES # BLD AUTO: 19.1 % — LOW (ref 20.5–51.1)
LYMPHOCYTES # BLD AUTO: 2.41 K/UL — SIGNIFICANT CHANGE UP (ref 1.2–3.4)
MAGNESIUM SERPL-MCNC: 2 MG/DL — SIGNIFICANT CHANGE UP (ref 1.8–2.4)
MCHC RBC-ENTMCNC: 24.8 PG — LOW (ref 27–31)
MCHC RBC-ENTMCNC: 31.8 G/DL — LOW (ref 32–37)
MCV RBC AUTO: 77.9 FL — LOW (ref 81–99)
MONOCYTES # BLD AUTO: 0.71 K/UL — HIGH (ref 0.1–0.6)
MONOCYTES NFR BLD AUTO: 5.6 % — SIGNIFICANT CHANGE UP (ref 1.7–9.3)
NEUTROPHILS # BLD AUTO: 9.13 K/UL — HIGH (ref 1.4–6.5)
NEUTROPHILS NFR BLD AUTO: 72.1 % — SIGNIFICANT CHANGE UP (ref 42.2–75.2)
NITRITE UR-MCNC: NEGATIVE — SIGNIFICANT CHANGE UP
NRBC # BLD: 0 /100 WBCS — SIGNIFICANT CHANGE UP (ref 0–0)
PH UR: 5.5 — SIGNIFICANT CHANGE UP (ref 5–8)
PLATELET # BLD AUTO: 295 K/UL — SIGNIFICANT CHANGE UP (ref 130–400)
POTASSIUM SERPL-MCNC: 4.2 MMOL/L — SIGNIFICANT CHANGE UP (ref 3.5–5)
POTASSIUM SERPL-SCNC: 4.2 MMOL/L — SIGNIFICANT CHANGE UP (ref 3.5–5)
PROT SERPL-MCNC: 6.8 G/DL — SIGNIFICANT CHANGE UP (ref 6–8)
PROT UR-MCNC: NEGATIVE — SIGNIFICANT CHANGE UP
RBC # BLD: 4.8 M/UL — SIGNIFICANT CHANGE UP (ref 4.2–5.4)
RBC # FLD: 15.7 % — HIGH (ref 11.5–14.5)
RBC CASTS # UR COMP ASSIST: 6 /HPF — HIGH (ref 0–4)
SODIUM SERPL-SCNC: 139 MMOL/L — SIGNIFICANT CHANGE UP (ref 135–146)
SP GR SPEC: 1.01 — SIGNIFICANT CHANGE UP (ref 1.01–1.03)
UROBILINOGEN FLD QL: SIGNIFICANT CHANGE UP
WBC # BLD: 12.65 K/UL — HIGH (ref 4.8–10.8)
WBC # FLD AUTO: 12.65 K/UL — HIGH (ref 4.8–10.8)
WBC UR QL: 49 /HPF — HIGH (ref 0–5)

## 2023-02-23 PROCEDURE — 93970 EXTREMITY STUDY: CPT | Mod: 26

## 2023-02-23 PROCEDURE — 99232 SBSQ HOSP IP/OBS MODERATE 35: CPT

## 2023-02-23 RX ADMIN — ATORVASTATIN CALCIUM 20 MILLIGRAM(S): 80 TABLET, FILM COATED ORAL at 21:47

## 2023-02-23 RX ADMIN — GABAPENTIN 300 MILLIGRAM(S): 400 CAPSULE ORAL at 05:12

## 2023-02-23 RX ADMIN — HEPARIN SODIUM 5000 UNIT(S): 5000 INJECTION INTRAVENOUS; SUBCUTANEOUS at 05:12

## 2023-02-23 RX ADMIN — Medication 1: at 12:16

## 2023-02-23 RX ADMIN — Medication 7.5 MILLIGRAM(S): at 17:03

## 2023-02-23 RX ADMIN — Medication 81 MILLIGRAM(S): at 12:18

## 2023-02-23 RX ADMIN — Medication 8 UNIT(S): at 08:22

## 2023-02-23 RX ADMIN — CLOPIDOGREL BISULFATE 75 MILLIGRAM(S): 75 TABLET, FILM COATED ORAL at 12:15

## 2023-02-23 RX ADMIN — Medication 8 UNIT(S): at 17:03

## 2023-02-23 RX ADMIN — Medication 7.5 MILLIGRAM(S): at 05:09

## 2023-02-23 RX ADMIN — AMLODIPINE BESYLATE 5 MILLIGRAM(S): 2.5 TABLET ORAL at 05:13

## 2023-02-23 RX ADMIN — SPIRONOLACTONE 12.5 MILLIGRAM(S): 25 TABLET, FILM COATED ORAL at 05:10

## 2023-02-23 RX ADMIN — Medication 8 UNIT(S): at 12:16

## 2023-02-23 RX ADMIN — HEPARIN SODIUM 5000 UNIT(S): 5000 INJECTION INTRAVENOUS; SUBCUTANEOUS at 17:03

## 2023-02-23 RX ADMIN — GABAPENTIN 300 MILLIGRAM(S): 400 CAPSULE ORAL at 13:20

## 2023-02-23 RX ADMIN — GABAPENTIN 300 MILLIGRAM(S): 400 CAPSULE ORAL at 21:47

## 2023-02-23 RX ADMIN — LOSARTAN POTASSIUM 50 MILLIGRAM(S): 100 TABLET, FILM COATED ORAL at 05:12

## 2023-02-23 NOTE — PROGRESS NOTE ADULT - ASSESSMENT
69 YO F w/ a PMH of DM2, HTN, HLD, CKD3, and chronic diabetic retinopathy of both eyes who presents to the hospital w/ a c/o left-sided vision loss that occurred suddenly x 5 days ago. Described as sudden painless, and turned into left-sided blurry vision over the next several days. Denies any pain, temporal pain, or fevers/chills. No palpitations or CP. ROS is negative except as above.       #LEFT EYE BLURRY VISION  CENTRAL RETINAL ARTERY OCCLUSION?  -continuous PAINLESS blurry vision in left eye for 5 days  -Saw her ophthalmologist this morning and he was concerned for CRAO based on his eye exam. He did not do any intervention and sent her to ed for stroke w/u   -at this point pt is out of timeline of any potential emergent intervention, though data and studies are anyways unclear about acute treatment for CRAO  -CTH: no acute path, indeterminate lacunar infarcts  -CTA H+N: No evidence of major vascular stenosis or occlusion.Scattered mild atheromatous changes as above.  -neuro eval, following added plavix as per neuro recommendations   -Optho eval, recomended carotid , echo   -ECG  -TTE    #Leukocytosis  -wbc 16k, no other sirs   -CRP 18.5  -ESR pending  -something to consider in monocular vision loss is GCA which may explain wbc and crp though pt has absolutely no pain both in eye or temporal area, no headaches, jaw claudication, fever/sick symptoms, or signs of vacular abnormalities. Therefore, pt does not meet criteria for steroids at this time.   -hold abx for now and monitor.   monitor for any fever spikes    #HTN  #HLD  -c/w losartan 50  -c/w amlodipine 5  -c/w spirinolaxone 12.5  -c/w lipitor 20    #DM   #diabetic neuropathy  -takes tradjenta, novolog, basaglar, jardiance at home  -c/w home dose lantus 38 units, admelog 8u TID, ISS  -monitor and adjust   -c/w gabapentin 300 tid    #Anxiety  -c/w buspirone 7.5    DVT ppx: heparin  Diet: dash  Dispo: from home     follow up: MR, echo, duplex carotid, neurology and ophthalmology. monitor for nay fever spikes   family at bedside discussed in length with patient.    Follow up retina specialist Dr. Zhen Martinez upon discharge   67 YO F w/ a PMH of DM2, HTN, HLD, CKD3, and chronic diabetic retinopathy of both eyes who presents to the hospital w/ a c/o left-sided vision loss that occurred suddenly x 5 days ago. Described as sudden painless, and turned into left-sided blurry vision over the next several days. Denies any pain, temporal pain, or fevers/chills. No palpitations or CP. ROS is negative except as above.       #LEFT EYE BLURRY VISION  CENTRAL RETINAL ARTERY OCCLUSION?  -continuous PAINLESS blurry vision in left eye for 5 days before presentation   -Saw her ophthalmologist this morning and he was concerned for CRAO based on his eye exam. He did not do any intervention and sent her to ed for stroke w/u   -at this point pt is out of timeline of any potential emergent intervention, though data and studies are anyways unclear about acute treatment for CRAO  -CTH: no acute path, indeterminate lacunar infarcts  -CTA H+N: No evidence of major vascular stenosis or occlusion.Scattered mild atheromatous changes as above.  -neuro eval, following added plavix as per neuro recommendations   -Optho eval, recomended carotid , echo   -TTE reported possible PFO  cardiology consulted for EMILE.   EP for loop recorder   MRI reported MRI brain: Scattered punctate acute infarcts in multiple vascular   territories suggestive of a central embolic etiology.    #Leukocytosis, improving   -wbc 16k, no other sirs   -CRP 18.5  -ESR 45  monitor for fever spikes   UA ordered     #HTN  #HLD  -c/w losartan 50  -c/w amlodipine 5  -c/w spirinolaxone 12.5  -c/w lipitor 20    #DM   #diabetic neuropathy  -takes tradjenta, novolog, basaglar, jardiance at home  -c/w home dose lantus 38 units, admelog 8u TID, ISS  -monitor and adjust   -c/w gabapentin 300 tid    #Anxiety  -c/w buspirone 7.5    DVT ppx: heparin  Diet: dash  Dispo: from home     follow up: MR,  duplex carotid pending, neurology and ophthalmology follow up monitor for  any fever spikes.  cardilogy for EMILE  EP for loop    family at bedside discussed in length with patient.    Follow up retina specialist Dr. Zhen Martinez upon discharge   67 YO F w/ a PMH of DM2, HTN, HLD, CKD3, and chronic diabetic retinopathy of both eyes who presents to the hospital w/ a c/o left-sided vision loss that occurred suddenly x 5 days ago. Described as sudden painless, and turned into left-sided blurry vision over the next several days. Denies any pain, temporal pain, or fevers/chills. No palpitations or CP. ROS is negative except as above.       #LEFT EYE BLURRY VISION  CENTRAL RETINAL ARTERY OCCLUSION?  -continuous PAINLESS blurry vision in left eye for 5 days before presentation   -Saw her ophthalmologist this morning and he was concerned for CRAO based on his eye exam. He did not do any intervention and sent her to ed for stroke w/u   -at this point pt is out of timeline of any potential emergent intervention, though data and studies are anyways unclear about acute treatment for CRAO  -CTH: no acute path, indeterminate lacunar infarcts  -CTA H+N: No evidence of major vascular stenosis or occlusion.Scattered mild atheromatous changes as above.  -neuro eval, following added plavix as per neuro recommendations   -Optho eval, recomended carotid , echo   -TTE reported possible PFO  cardiology consulted for EMILE.   EP for loop recorder   MRI reported MRI brain: Scattered punctate acute infarcts in multiple vascular   territories suggestive of a central embolic etiology.  as per neurology  Telemetry monitoring  Recommend EMILE and Loop recorder  B/L LE dopplers.   If that workup is negative can be discharged from a stroke perspective on Aspirin 81 mg daily and Plavix 75 mg po daily(Plavix should be for only 21 days)  At that time follow up in the stroke clinic in two weeks.     #Leukocytosis, improving   -wbc 16k, no other sirs   -CRP 18.5  -ESR 45  monitor for fever spikes   UA ordered     #HTN  #HLD  -c/w losartan 50  -c/w amlodipine 5  -c/w spirinolaxone 12.5  -c/w lipitor 20    #DM   #diabetic neuropathy  -takes tradjenta, novolog, basaglar, jardiance at home  -c/w home dose lantus 38 units, admelog 8u TID, ISS  -monitor and adjust   -c/w gabapentin 300 tid    #Anxiety  -c/w buspirone 7.5    DVT ppx: heparin  Diet: dash  Dispo: from home     follow up: MR,  duplex carotid pending, neurology and ophthalmology follow up monitor for  any fever spikes.  cardilogy for EMILE  EP for loop   Follow up retina specialist Dr. Zhen Martinez upon discharge

## 2023-02-23 NOTE — PROGRESS NOTE ADULT - SUBJECTIVE AND OBJECTIVE BOX
MERLIN PORTER  68y, Female  Allergy: No Known Allergies    Hospital Day: 1d    Patient seen and examined earlier today. no complaints no improvement in the vision     PMH/PSH:  PAST MEDICAL & SURGICAL HISTORY:      LAST 24-Hr EVENTS:    VITALS:  Vital Signs Last 24 Hrs  T(C): 36.6 (23 Feb 2023 14:16), Max: 36.6 (22 Feb 2023 19:56)  T(F): 97.8 (23 Feb 2023 14:16), Max: 97.9 (22 Feb 2023 19:56)  HR: 90 (23 Feb 2023 14:16) (88 - 91)  BP: 128/60 (23 Feb 2023 14:16) (128/60 - 133/65)  BP(mean): 87 (23 Feb 2023 14:16) (84 - 87)  RR: 18 (23 Feb 2023 14:16) (18 - 18)  SpO2: --              TESTS & MEASUREMENTS:  Weight/BMI  111.13 (02-21-23 @ 22:15)  38.4 (02-21-23 @ 22:15)                 LABS:                        11.9   12.65 )-----------( 295      ( 23 Feb 2023 06:35 )             37.4     02-23    139  |  105  |  33<H>  ----------------------------<  119<H>  4.2   |  20  |  1.5    Ca    9.8      23 Feb 2023 06:35  Mg     2.0     02-23    TPro  6.8  /  Alb  3.9  /  TBili  0.5  /  DBili  x   /  AST  12  /  ALT  10  /  AlkPhos  101  02-23                                COVID-19 PCR: NotDetec (02-21-23 @ 14:49)        A1C with Estimated Average Glucose Result: 7.1 % (02-22-23 @ 07:15)          RADIOLOGY, ECG, & ADDITIONAL TESTS:  12 Lead ECG:   Ventricular Rate 85 BPM    Atrial Rate 85 BPM    P-R Interval 212 ms    QRS Duration 78 ms    Q-T Interval 366 ms    QTC Calculation(Bazett) 435 ms    P Axis 41 degrees    R Axis 40 degrees    T Axis 49 degrees    Diagnosis Line Sinus rhythm with 1st degree A-V block  Otherwise normal ECG    Confirmed by DONG WILSON MD ([id number]) on 2/22/2023 7:03:55 AM (02-21-23 @ 16:51)    CT Head No Cont:   ACC: [id number] EXAM:  CT BRAIN   ORDERED BY: DELILAH HERMOSILLO     PROCEDURE DATE:  02/21/2023          INTERPRETATION:  Clinical History / Reason for exam: Left eye blurred   vision    Technique: Noncontrast head CT.  Contiguous unenhanced CT axialimages of   the head from the base to the vertex with coronal and sagittal reformats.    Comparison: None available    Findings:    The ventricles and cortical sulci are normal in size and configuration.    There are lacunar infarcts in the right corona radiata and basal ganglia   of indeterminate age.    There is no acute intracranial hemorrhage, extra-axial fluid collection   or midline shift.  Gray-white matter differentiation is maintained.    Vascular calcifications are noted.    The visualized paranasal sinuses and mastoids are clear.    IMPRESSION:    1.  No evidence of acute intracranial hemorrhage or large territory   infarct.    2.  Lacunar infarcts in the right basal ganglia and corona radiata of   indeterminate age.    --- End of Report ---            KATHERINE WILLIAMSON MD; Attending Radiologist  This document has been electronically signed. Feb 21 2023  3:17PM (02-21-23 @ 15:04)    RECENT DIAGNOSTIC ORDERS:  VA Duplex Carotid, Bilat: 15:19  Exam in Progress (02-22-23 @ 15:21)  Magnesium, Serum: AM Sched. Collection: 23-Feb-2023 04:30 (02-22-23 @ 11:17)  Comprehensive Metabolic Panel: AM Sched. Collection: 23-Feb-2023 04:30 (02-22-23 @ 11:17)  Complete Blood Count + Automated Diff: AM Sched. Collection: 23-Feb-2023 04:30 (02-22-23 @ 11:17)  MR Head No Cont: Routine   Indication: acute vision loss  Transport: Wheelchair (02-22-23 @ 11:17)  MR Orbit, Face, and/or Neck No Cont: Routine   Indication: acute vision loss  Transport: Wheelchair (02-22-23 @ 11:17)  TTE Echo Complete w/o Contrast w/ Doppler:   Transport: Wheelchair  Monitor: w/o Monitor (02-21-23 @ 18:31)  Diet, DASH/TLC:   Sodium & Cholesterol Restricted (02-21-23 @ 18:31)      MEDICATIONS:  MEDICATIONS  (STANDING):  amLODIPine   Tablet 5 milliGRAM(s) Oral daily  aspirin  chewable 81 milliGRAM(s) Oral daily  atorvastatin 20 milliGRAM(s) Oral at bedtime  busPIRone 7.5 milliGRAM(s) Oral two times a day  dextrose 5%. 1000 milliLiter(s) (50 mL/Hr) IV Continuous <Continuous>  dextrose 5%. 1000 milliLiter(s) (100 mL/Hr) IV Continuous <Continuous>  dextrose 50% Injectable 25 Gram(s) IV Push once  dextrose 50% Injectable 12.5 Gram(s) IV Push once  dextrose 50% Injectable 25 Gram(s) IV Push once  gabapentin 300 milliGRAM(s) Oral three times a day  glucagon  Injectable 1 milliGRAM(s) IntraMuscular once  heparin   Injectable 5000 Unit(s) SubCutaneous every 12 hours  insulin glargine Injectable (LANTUS) 38 Unit(s) SubCutaneous at bedtime  insulin lispro (ADMELOG) corrective regimen sliding scale   SubCutaneous three times a day before meals  insulin lispro Injectable (ADMELOG) 8 Unit(s) SubCutaneous three times a day before meals  losartan 50 milliGRAM(s) Oral daily  spironolactone 12.5 milliGRAM(s) Oral daily    MEDICATIONS  (PRN):  dextrose Oral Gel 15 Gram(s) Oral once PRN Blood Glucose LESS THAN 70 milliGRAM(s)/deciliter      HOME MEDICATIONS:  Admelog 100 units/mL injectable solution (02-21)  amLODIPine 5 mg oral tablet (02-21)  aspirin 81 mg oral tablet, chewable (02-21)  atorvastatin 20 mg oral tablet (02-21)  busPIRone 7.5 mg oral tablet (02-21)  gabapentin 300 mg oral capsule (02-21)  Jardiance 10 mg oral tablet (02-21)  losartan 50 mg oral tablet (02-21)  NovoLOG 100 units/mL injectable solution (02-21)  spironolactone 25 mg oral tablet (02-21)  Tradjenta 5 mg oral tablet (02-21)      PHYSICAL EXAM:  GENERAL: Patient lying on bed, comfoprtable   CHEST/LUNG: NVB, no wehezing   HEART: R1+R2, RRR  ABDOMEN: Soft. non tender, BS positive  EXTREMITIES:  no edema, Bruising  CNS: AAAx4. No cranial nerves deficit.

## 2023-02-24 ENCOUNTER — TRANSCRIPTION ENCOUNTER (OUTPATIENT)
Age: 68
End: 2023-02-24

## 2023-02-24 VITALS
SYSTOLIC BLOOD PRESSURE: 123 MMHG | HEART RATE: 93 BPM | OXYGEN SATURATION: 100 % | TEMPERATURE: 97 F | RESPIRATION RATE: 18 BRPM | DIASTOLIC BLOOD PRESSURE: 57 MMHG

## 2023-02-24 LAB
ALBUMIN SERPL ELPH-MCNC: 4 G/DL — SIGNIFICANT CHANGE UP (ref 3.5–5.2)
ALP SERPL-CCNC: 100 U/L — SIGNIFICANT CHANGE UP (ref 30–115)
ALT FLD-CCNC: 11 U/L — SIGNIFICANT CHANGE UP (ref 0–41)
ANION GAP SERPL CALC-SCNC: 12 MMOL/L — SIGNIFICANT CHANGE UP (ref 7–14)
AST SERPL-CCNC: 15 U/L — SIGNIFICANT CHANGE UP (ref 0–41)
BASOPHILS # BLD AUTO: 0.06 K/UL — SIGNIFICANT CHANGE UP (ref 0–0.2)
BASOPHILS NFR BLD AUTO: 0.5 % — SIGNIFICANT CHANGE UP (ref 0–1)
BILIRUB SERPL-MCNC: 0.4 MG/DL — SIGNIFICANT CHANGE UP (ref 0.2–1.2)
BUN SERPL-MCNC: 35 MG/DL — HIGH (ref 10–20)
CALCIUM SERPL-MCNC: 9.6 MG/DL — SIGNIFICANT CHANGE UP (ref 8.4–10.5)
CHLORIDE SERPL-SCNC: 104 MMOL/L — SIGNIFICANT CHANGE UP (ref 98–110)
CO2 SERPL-SCNC: 23 MMOL/L — SIGNIFICANT CHANGE UP (ref 17–32)
CREAT SERPL-MCNC: 1.6 MG/DL — HIGH (ref 0.7–1.5)
EGFR: 35 ML/MIN/1.73M2 — LOW
EOSINOPHIL # BLD AUTO: 0.34 K/UL — SIGNIFICANT CHANGE UP (ref 0–0.7)
EOSINOPHIL NFR BLD AUTO: 2.7 % — SIGNIFICANT CHANGE UP (ref 0–8)
GLUCOSE BLDC GLUCOMTR-MCNC: 126 MG/DL — HIGH (ref 70–99)
GLUCOSE BLDC GLUCOMTR-MCNC: 150 MG/DL — HIGH (ref 70–99)
GLUCOSE SERPL-MCNC: 133 MG/DL — HIGH (ref 70–99)
HCT VFR BLD CALC: 39.1 % — SIGNIFICANT CHANGE UP (ref 37–47)
HGB BLD-MCNC: 12.6 G/DL — SIGNIFICANT CHANGE UP (ref 12–16)
IMM GRANULOCYTES NFR BLD AUTO: 0.4 % — HIGH (ref 0.1–0.3)
LYMPHOCYTES # BLD AUTO: 2.71 K/UL — SIGNIFICANT CHANGE UP (ref 1.2–3.4)
LYMPHOCYTES # BLD AUTO: 21.3 % — SIGNIFICANT CHANGE UP (ref 20.5–51.1)
MAGNESIUM SERPL-MCNC: 2 MG/DL — SIGNIFICANT CHANGE UP (ref 1.8–2.4)
MCHC RBC-ENTMCNC: 25.7 PG — LOW (ref 27–31)
MCHC RBC-ENTMCNC: 32.2 G/DL — SIGNIFICANT CHANGE UP (ref 32–37)
MCV RBC AUTO: 79.6 FL — LOW (ref 81–99)
MONOCYTES # BLD AUTO: 0.75 K/UL — HIGH (ref 0.1–0.6)
MONOCYTES NFR BLD AUTO: 5.9 % — SIGNIFICANT CHANGE UP (ref 1.7–9.3)
NEUTROPHILS # BLD AUTO: 8.82 K/UL — HIGH (ref 1.4–6.5)
NEUTROPHILS NFR BLD AUTO: 69.2 % — SIGNIFICANT CHANGE UP (ref 42.2–75.2)
NRBC # BLD: 0 /100 WBCS — SIGNIFICANT CHANGE UP (ref 0–0)
PLATELET # BLD AUTO: 328 K/UL — SIGNIFICANT CHANGE UP (ref 130–400)
POTASSIUM SERPL-MCNC: 4.6 MMOL/L — SIGNIFICANT CHANGE UP (ref 3.5–5)
POTASSIUM SERPL-SCNC: 4.6 MMOL/L — SIGNIFICANT CHANGE UP (ref 3.5–5)
PROT SERPL-MCNC: 6.9 G/DL — SIGNIFICANT CHANGE UP (ref 6–8)
RBC # BLD: 4.91 M/UL — SIGNIFICANT CHANGE UP (ref 4.2–5.4)
RBC # FLD: 15.6 % — HIGH (ref 11.5–14.5)
SODIUM SERPL-SCNC: 139 MMOL/L — SIGNIFICANT CHANGE UP (ref 135–146)
WBC # BLD: 12.73 K/UL — HIGH (ref 4.8–10.8)
WBC # FLD AUTO: 12.73 K/UL — HIGH (ref 4.8–10.8)

## 2023-02-24 PROCEDURE — 93325 DOPPLER ECHO COLOR FLOW MAPG: CPT | Mod: 26

## 2023-02-24 PROCEDURE — 33285 INSJ SUBQ CAR RHYTHM MNTR: CPT

## 2023-02-24 PROCEDURE — 93320 DOPPLER ECHO COMPLETE: CPT | Mod: 26

## 2023-02-24 PROCEDURE — 99239 HOSP IP/OBS DSCHRG MGMT >30: CPT

## 2023-02-24 PROCEDURE — 93312 ECHO TRANSESOPHAGEAL: CPT | Mod: 26,XU

## 2023-02-24 PROCEDURE — 99223 1ST HOSP IP/OBS HIGH 75: CPT | Mod: 25,57

## 2023-02-24 RX ORDER — ATORVASTATIN CALCIUM 80 MG/1
1 TABLET, FILM COATED ORAL
Qty: 0 | Refills: 0 | DISCHARGE

## 2023-02-24 RX ORDER — ASPIRIN/CALCIUM CARB/MAGNESIUM 324 MG
1 TABLET ORAL
Qty: 0 | Refills: 0 | DISCHARGE

## 2023-02-24 RX ORDER — INSULIN GLARGINE 100 [IU]/ML
38 INJECTION, SOLUTION SUBCUTANEOUS
Qty: 0 | Refills: 0 | DISCHARGE

## 2023-02-24 RX ORDER — INSULIN ASPART 100 [IU]/ML
8 INJECTION, SOLUTION SUBCUTANEOUS
Qty: 0 | Refills: 0 | DISCHARGE

## 2023-02-24 RX ORDER — INSULIN LISPRO 100/ML
0 VIAL (ML) SUBCUTANEOUS
Qty: 0 | Refills: 0 | DISCHARGE

## 2023-02-24 RX ORDER — CLOPIDOGREL BISULFATE 75 MG/1
1 TABLET, FILM COATED ORAL
Qty: 21 | Refills: 0
Start: 2023-02-24 | End: 2023-03-16

## 2023-02-24 RX ORDER — ATORVASTATIN CALCIUM 80 MG/1
2 TABLET, FILM COATED ORAL
Qty: 60 | Refills: 2
Start: 2023-02-24 | End: 2023-05-24

## 2023-02-24 RX ORDER — INSULIN ASPART 100 [IU]/ML
0 INJECTION, SOLUTION SUBCUTANEOUS
Qty: 0 | Refills: 0 | DISCHARGE

## 2023-02-24 RX ORDER — ASPIRIN/CALCIUM CARB/MAGNESIUM 324 MG
1 TABLET ORAL
Qty: 30 | Refills: 3
Start: 2023-02-24 | End: 2023-06-23

## 2023-02-24 RX ADMIN — HEPARIN SODIUM 5000 UNIT(S): 5000 INJECTION INTRAVENOUS; SUBCUTANEOUS at 06:01

## 2023-02-24 RX ADMIN — GABAPENTIN 300 MILLIGRAM(S): 400 CAPSULE ORAL at 15:26

## 2023-02-24 RX ADMIN — AMLODIPINE BESYLATE 5 MILLIGRAM(S): 2.5 TABLET ORAL at 06:00

## 2023-02-24 RX ADMIN — CLOPIDOGREL BISULFATE 75 MILLIGRAM(S): 75 TABLET, FILM COATED ORAL at 15:25

## 2023-02-24 RX ADMIN — GABAPENTIN 300 MILLIGRAM(S): 400 CAPSULE ORAL at 06:00

## 2023-02-24 RX ADMIN — LOSARTAN POTASSIUM 50 MILLIGRAM(S): 100 TABLET, FILM COATED ORAL at 06:00

## 2023-02-24 RX ADMIN — Medication 7.5 MILLIGRAM(S): at 05:59

## 2023-02-24 RX ADMIN — Medication 81 MILLIGRAM(S): at 15:25

## 2023-02-24 RX ADMIN — SPIRONOLACTONE 12.5 MILLIGRAM(S): 25 TABLET, FILM COATED ORAL at 06:08

## 2023-02-24 NOTE — PROGRESS NOTE ADULT - SUBJECTIVE AND OBJECTIVE BOX
SUBJECTIVE / OVERNIGHT EVENTS  Patient slept well overnight. No acute complaints this AM. Patient does not report fevers, chills, CP, SOB, or n/v/d    MEDICATIONS  amLODIPine   Tablet 5 milliGRAM(s) Oral daily  aspirin  chewable 81 milliGRAM(s) Oral daily  atorvastatin 20 milliGRAM(s) Oral at bedtime  busPIRone 7.5 milliGRAM(s) Oral two times a day  clopidogrel Tablet 75 milliGRAM(s) Oral daily  dextrose 5%. 1000 milliLiter(s) IV Continuous <Continuous>  dextrose 5%. 1000 milliLiter(s) IV Continuous <Continuous>  dextrose 50% Injectable 25 Gram(s) IV Push once  dextrose 50% Injectable 12.5 Gram(s) IV Push once  dextrose 50% Injectable 25 Gram(s) IV Push once  gabapentin 300 milliGRAM(s) Oral three times a day  glucagon  Injectable 1 milliGRAM(s) IntraMuscular once  heparin   Injectable 5000 Unit(s) SubCutaneous every 12 hours  insulin glargine Injectable (LANTUS) 38 Unit(s) SubCutaneous at bedtime  insulin lispro (ADMELOG) corrective regimen sliding scale   SubCutaneous three times a day before meals  insulin lispro Injectable (ADMELOG) 8 Unit(s) SubCutaneous three times a day before meals  losartan 50 milliGRAM(s) Oral daily  spironolactone 12.5 milliGRAM(s) Oral daily    dextrose Oral Gel 15 Gram(s) Oral once PRN Blood Glucose LESS THAN 70 milliGRAM(s)/deciliter    VITALS /  EXAM    T(C): 36.3 (23 @ 05:23), Max: 36.6 (23 @ 14:16)  HR: 85 (23 @ 05:23) (85 - 90)  BP: 120/58 (23 @ 05:23) (107/57 - 128/60)  RR: 18 (23 @ 20:45) (18 - 18)  SpO2: 100% (23 @ 20:45) (100% - 100%)  POCT Blood Glucose.: 150 mg/dL (23 @ 07:48)  POCT Blood Glucose.: 114 mg/dL (23 @ 21:36)  POCT Blood Glucose.: 126 mg/dL (23 @ 16:24)  POCT Blood Glucose.: 169 mg/dL (23 @ 11:16)    GENERAL: NAD, well-developed  CHEST/LUNG: Clear to auscultation bilaterally; No wheezes, rales or rhonchi  HEART: Regular rate and rhythm; No murmurs, rubs, or gallops  ABDOMEN: Soft, Nontender, Nondistended; Bowel sounds present, no masses.  EXTREMITIES:  2+ Peripheral Pulses, No clubbing, cyanosis, or edema  Neuro: patient reports blurry vision, her visual fields and EOM are normal. no nystagmus seen. no focal deficit     I's & O's     LABS             12.6   12.73 )-----------( 328      ( 23 @ 06:07 )             39.1     139  |  104  |  35  -------------------------<  133   23 @ 06:07  4.6  |  23  |  1.6    Ca      9.6     23 @ 06:07  Mg     2.0     23 @ 06:07    TPro  6.9  /  Alb  4.0  /  TBili  0.4  /  DBili  x   /  AST  15  /  ALT  11  /  AlkPhos  100  /  GGT  x     23 @ 06:07                    Urinalysis Basic - ( 2023 18:18 )    Color: Light Yellow / Appearance: Clear / S.015 / pH: x  Gluc: x / Ketone: Negative  / Bili: Negative / Urobili: <2 mg/dL   Blood: x / Protein: Negative / Nitrite: Negative   Leuk Esterase: Large / RBC: 6 /HPF / WBC 49 /HPF   Sq Epi: x / Non Sq Epi: 1 /HPF / Bacteria: Negative      MICRO / IMAGING / CARDIOLOGY  Telemetry: Reviewed   EKG: Reviewed    CULTURES    IMAGING  PACS Image:  (23 @ 19:36)  PACS Image:  (23 @ 19:36)  PACS Image:  (23 @ 19:12)    CARDIOLOGY

## 2023-02-24 NOTE — DISCHARGE NOTE PROVIDER - PROVIDER TOKENS
PROVIDER:[TOKEN:[[id number]],FOLLOWUP:[1 week]],PROVIDER:[TOKEN:[[id number]],FOLLOWUP:[1 week]] PROVIDER:[TOKEN:[[id number]],FOLLOWUP:[1 week]],PROVIDER:[TOKEN:[[id number]],FOLLOWUP:[1 week]],PROVIDER:[TOKEN:[[id number]],FOLLOWUP:[1 week]]

## 2023-02-24 NOTE — DISCHARGE NOTE PROVIDER - CARE PROVIDERS DIRECT ADDRESSES
,[email or web address],DirectAddress_Unknown ,[email or web address],DirectAddress_Unknown,[email or web address]

## 2023-02-24 NOTE — CONSULT NOTE ADULT - SUBJECTIVE AND OBJECTIVE BOX
Neurology Consult Note     HPI:  This is a 69 y/o F with DM, HTN, HLD, CKD, chronic diabetic retinopathy of both eyes, who presents with left eye blurred vision x 5 days. Last thursday, pt was home and in the evening she had acute onset blurred vision in her left eye, she decided to go to sleep. The next morning when she woke up the blurred vision was gone but a few hours later it resumed and has stayed blurry ever since. The blurriness is at its worse in the middle/central field of vision, she can make out outlines but cannot read or really tell colors. The right eye is not affected. There is NO pain whatsoever. Pt went to her opthomatologist this morning, he did an exam and was concerned for central retinal artery occlusion so he sent her to the ED for imaging. Denies weakness, numbness, HA, slurred speech.      PAST MEDICAL & SURGICAL HISTORY:  Diabetes Mellitus  Hypertension  Hyperlipidemia  Chronic kidney disease  Diabetic retinopathy  Macular degeneration      Medications:  amLODIPine   Tablet 5 milliGRAM(s) Oral daily  aspirin  chewable 81 milliGRAM(s) Oral daily  atorvastatin 20 milliGRAM(s) Oral at bedtime  busPIRone 7.5 milliGRAM(s) Oral two times a day  dextrose 5%. 1000 milliLiter(s) IV Continuous <Continuous>  dextrose 5%. 1000 milliLiter(s) IV Continuous <Continuous>  dextrose 50% Injectable 25 Gram(s) IV Push once  dextrose 50% Injectable 12.5 Gram(s) IV Push once  dextrose 50% Injectable 25 Gram(s) IV Push once  dextrose Oral Gel 15 Gram(s) Oral once PRN  gabapentin 300 milliGRAM(s) Oral three times a day  glucagon  Injectable 1 milliGRAM(s) IntraMuscular once  heparin   Injectable 5000 Unit(s) SubCutaneous every 12 hours  insulin glargine Injectable (LANTUS) 38 Unit(s) SubCutaneous at bedtime  insulin lispro (ADMELOG) corrective regimen sliding scale   SubCutaneous three times a day before meals  insulin lispro Injectable (ADMELOG) 8 Unit(s) SubCutaneous three times a day before meals  losartan 50 milliGRAM(s) Oral daily  spironolactone 12.5 milliGRAM(s) Oral daily      Vital Signs Last 24 Hrs  T(C): 36.1 (21 Feb 2023 13:29), Max: 36.1 (21 Feb 2023 11:48)  T(F): 97 (21 Feb 2023 13:29), Max: 97 (21 Feb 2023 11:48)  HR: 95 (21 Feb 2023 13:29) (95 - 98)  BP: 142/72 (21 Feb 2023 13:29) (142/72 - 169/79)  RR: 20 (21 Feb 2023 13:29) (20 - 20)  SpO2: 96% (21 Feb 2023 13:29) (96% - 96%)    Parameters below as of 21 Feb 2023 13:29  Patient On (Oxygen Delivery Method): room air      Neurological Exam:   Mental status: Awake, alert and oriented x3. No dysarthria, no aphasia. Follows commands.  Cranial nerves: Pupils equally round and reactive to light, visual fields full, no nystagmus, extraocular muscles intact, V1 through V3 intact bilaterally and symmetric, face symmetric, tongue was midline.  Motor: MRC grading 5/5 B/L UE/LE.  strength 5/5. Normal tone and bulk. No abnormal movements.    Sensation: Intact to light touch, proprioception, and pinprick.   Coordination: No dysmetria on finger-to-nose and heel-to-shin.  Reflexes: 2+ in bilateral UE/LE, downgoing toes bilaterally.  Gait: Deferred.      Labs:  CBC Full  -  ( 21 Feb 2023 14:42 )  WBC Count : 16.28 K/uL  RBC Count : 5.78 M/uL  Hemoglobin : 13.8 g/dL  Hematocrit : 46.4 %  Platelet Count - Automated : 387 K/uL  Mean Cell Volume : 80.3 fL  Mean Cell Hemoglobin : 23.9 pg  Mean Cell Hemoglobin Concentration : 29.7 g/dL  Auto Neutrophil # : 12.92 K/uL  Auto Lymphocyte # : 2.40 K/uL  Auto Monocyte # : 0.64 K/uL  Auto Eosinophil # : 0.13 K/uL  Auto Basophil # : 0.09 K/uL  Auto Neutrophil % : 79.4 %  Auto Lymphocyte % : 14.7 %  Auto Monocyte % : 3.9 %  Auto Eosinophil % : 0.8 %  Auto Basophil % : 0.6 %    02-21    139  |  104  |  32<H>  ----------------------------<  131<H>  4.5   |  20  |  1.5    Ca    9.9      21 Feb 2023 14:42      < from: CT Head No Cont (02.21.23 @ 15:04) >  PROCEDURE DATE:  02/21/2023          INTERPRETATION:  Clinical History / Reason for exam: Left eye blurred   vision    Technique: Noncontrast head CT.  Contiguous unenhanced CT axialimages of   the head from the base to the vertex with coronal and sagittal reformats.    Comparison: None available    Findings:    The ventricles and cortical sulci are normal in size and configuration.    There are lacunar infarcts in the right corona radiata and basal ganglia   of indeterminate age.    There is no acute intracranial hemorrhage, extra-axial fluid collection   or midline shift.  Gray-white matter differentiation is maintained.    Vascular calcifications are noted.    The visualized paranasal sinuses and mastoids are clear.    IMPRESSION:    1.  No evidence of acute intracranial hemorrhage or large territory   infarct.    2.  Lacunar infarcts in the right basal ganglia and corona radiata of   indeterminate age.    < end of copied text >      < from: CT Angio Head w/ IV Cont (02.21.23 @ 15:04) >  PROCEDURE DATE:  02/21/2023          INTERPRETATION:  Clinical History / Reason forexam: Left side vision loss    Technique: CT angiogram of the head and neck. Contiguous CT axial images   of the head and neck were obtained following the intravenous   administration of 100 cc Optiray 320 (0 cc discarded) with coronal,   sagittal and multiple 3-D MIP and volume rendered reformats.    Comparison: None    Findings:    NECK:  The visualized aortic arch and great vessel origins are patent.    The right common, internal and external carotid arteries are patent.   There is calcific plaque of the right ICA origin without significant   stenosis.    The left common, internal and external carotid arteries are patent.    The vertebral arteries are patent.    HEAD:  There is calcific plaque of the carotid siphons without significant   stenosis. The anterior and middle cerebral arteries are patent.    There is calcific plaque of the V4 segments of the vertebral arteries   with mild stenosis. The basilar artery is patent. The posterior cerebral   arteries are patent.    OTHER: Multilevel degenerative changes of the spine.      IMPRESSION:    1.  No evidence of major vascular stenosis or occlusion.    2.  Scattered mild atheromatous changes as above.          
HISTORY OF PRESENT ILLNESS:  Pt is a 69 y/o F with DM, HTN, HLD, CKD, chronic diabetic retinopathy of both eyes, who presents with left eye blurred vision x 5 days. Last thursday, pt was home and in the evening she had acute onset blurred vision in her left eye, she decided to go to sleep. The next morning when she woke up the blurred vision was gone but a few hours later it resumed and has stayed blurry ever since. The blurriness is at its worse in the middle/central field of vision, she can make out outlines but cannot read or really tell colors. The right eye is not affected. There is NO pain whatsoever. Pt went to her opthomatologist this morning, he did an exam and was concerned for central retinal artery occlusion so he sent her to the ED for imaging. Denies weakness, numbness, HA, slurred speech.    In the ED, pt given a dose of plavix 75  Vitals: Vital Signs Last 24 Hrs  T(C): 36.1 (21 Feb 2023 13:29), Max: 36.1 (21 Feb 2023 11:48)  T(F): 97 (21 Feb 2023 13:29), Max: 97 (21 Feb 2023 11:48)  HR: 95 (21 Feb 2023 13:29) (95 - 98)  BP: 142/72 (21 Feb 2023 13:29) (142/72 - 169/79)  BP(mean): --  RR: 20 (21 Feb 2023 13:29) (20 - 20)  SpO2: 96% (21 Feb 2023 13:29) (96% - 96%)    Parameters below as of 21 Feb 2023 13:29  Patient On (Oxygen Delivery Method): room air    Labs: remarkable for wbc 16k, crp 18.5  Imaging:  < from: CT Angio Head w/ IV Cont (02.21.23 @ 15:04) >  IMPRESSION:    1.  No evidence of major vascular stenosis or occlusion.    2.  Scattered mild atheromatous changes as above.    < end of copied text >  < from: CT Head No Cont (02.21.23 @ 15:04) >  IMPRESSION:    1.  No evidence of acute intracranial hemorrhage or large territory   infarct.    2.  Lacunar infarcts in the right basal ganglia and corona radiata of   indeterminate age.    < end of copied text >   (21 Feb 2023 18:00)      PAST MEDICAL & SURGICAL HISTORY      FAMILY HISTORY:  FAMILY HISTORY:      SOCIAL HISTORY:  Social History:      ALLERGIES:  No Known Allergies      MEDICATIONS:  amLODIPine   Tablet 5 milliGRAM(s) Oral daily  aspirin  chewable 81 milliGRAM(s) Oral daily  atorvastatin 20 milliGRAM(s) Oral at bedtime  busPIRone 7.5 milliGRAM(s) Oral two times a day  clopidogrel Tablet 75 milliGRAM(s) Oral daily  dextrose 5%. 1000 milliLiter(s) (100 mL/Hr) IV Continuous <Continuous>  dextrose 5%. 1000 milliLiter(s) (50 mL/Hr) IV Continuous <Continuous>  dextrose 50% Injectable 25 Gram(s) IV Push once  dextrose 50% Injectable 12.5 Gram(s) IV Push once  dextrose 50% Injectable 25 Gram(s) IV Push once  gabapentin 300 milliGRAM(s) Oral three times a day  glucagon  Injectable 1 milliGRAM(s) IntraMuscular once  heparin   Injectable 5000 Unit(s) SubCutaneous every 12 hours  insulin glargine Injectable (LANTUS) 38 Unit(s) SubCutaneous at bedtime  insulin lispro (ADMELOG) corrective regimen sliding scale   SubCutaneous three times a day before meals  insulin lispro Injectable (ADMELOG) 8 Unit(s) SubCutaneous three times a day before meals  losartan 50 milliGRAM(s) Oral daily  spironolactone 12.5 milliGRAM(s) Oral daily    PRN:  dextrose Oral Gel 15 Gram(s) Oral once PRN      HOME MEDICATIONS:  Home Medications:  Admelog 100 units/mL injectable solution:  (21 Feb 2023 18:23)  amLODIPine 5 mg oral tablet: 1 tab(s) orally once a day (21 Feb 2023 18:24)  aspirin 81 mg oral tablet, chewable: 1 tab(s) orally once a day (21 Feb 2023 18:24)  atorvastatin 20 mg oral tablet: 1 tab(s) orally once a day (21 Feb 2023 18:24)  busPIRone 7.5 mg oral tablet: 1 tab(s) orally 2 times a day (21 Feb 2023 18:24)  gabapentin 300 mg oral capsule: 1 cap(s) orally 3 times a day (21 Feb 2023 18:23)  Jardiance 10 mg oral tablet: 1 tab(s) orally once a day (in the morning) (21 Feb 2023 18:25)  losartan 50 mg oral tablet: 1 tab(s) orally once a day (21 Feb 2023 18:23)  NovoLOG 100 units/mL injectable solution:  (21 Feb 2023 18:23)  spironolactone 25 mg oral tablet: 0.5 tab(s) orally once a day (21 Feb 2023 18:24)  Tradjenta 5 mg oral tablet: 1 tab(s) orally once a day (21 Feb 2023 18:23)      VITALS:   T(F): 97.4 (02-24 @ 05:23), Max: 97.9 (02-22 @ 19:56)  HR: 85 (02-24 @ 10:55) (84 - 98)  BP: 120/58 (02-24 @ 10:55) (107/57 - 169/79)  BP(mean): 87 (02-24 @ 10:55) (82 - 87)  RR: 18 (02-24 @ 10:55) (18 - 20)  SpO2: 100% (02-24 @ 10:55) (96% - 100%)    I&O's Summary      REVIEW OF SYSTEMS:  CONSTITUTIONAL: No weakness, fevers or chills  HEENT: No visual changes, neck/ear pain  RESPIRATORY: No cough, sob  CARDIOVASCULAR: See HPI  GASTROINTESTINAL: No abdominal pain. No nausea, vomiting, diarrhea   GENITOURINARY: No dysuria, frequency or hematuria  NEUROLOGICAL: No new focal deficits  SKIN: No new rashes    PHYSICAL EXAM:  General: Not in distress.  Non-toxic appearing.   HEENT: EOMI  Cardio: regular, S1, S2, no murmur  Pulm: B/L BS.  No wheezing / crackles / rales  Abdomen: Soft, non-tender, non-distended. Normoactive bowel sounds  Extremities: No edema b/l le  Neuro: A&O x3. No focal deficits    LABS:                        12.6   12.73 )-----------( 328      ( 24 Feb 2023 06:07 )             39.1     02-24    139  |  104  |  35<H>  ----------------------------<  133<H>  4.6   |  23  |  1.6<H>    Ca    9.6      24 Feb 2023 06:07  Mg     2.0     02-24    TPro  6.9  /  Alb  4.0  /  TBili  0.4  /  DBili  x   /  AST  15  /  ALT  11  /  AlkPhos  100  02-24    Troponin trend:  COVID-19 PCR: Apryl (21 Feb 2023 14:49)

## 2023-02-24 NOTE — DISCHARGE NOTE PROVIDER - ATTENDING DISCHARGE PHYSICAL EXAMINATION:
Patient seen at bedside. discussed in length with patient and spouse. neurology cleared for discharge. patient was advised to follow up with PCP , neurology and cardiology. patient understood and agreed with discharge.

## 2023-02-24 NOTE — DISCHARGE NOTE NURSING/CASE MANAGEMENT/SOCIAL WORK - NSDCPEFALRISK_GEN_ALL_CORE
For information on Fall & Injury Prevention, visit: [email or web address] OR  [email or web address] OR  https://www.cdc.gov/steadi/patient.html

## 2023-02-24 NOTE — CONSULT NOTE ADULT - ATTENDING COMMENTS
Patient seen and examined and agree with above except as noted.  Patients history, notes ,labs, imaging, vitals and meds reviewed personally.  Patient with decreased central vision in the left eye (painless).  Likely CRAO  Some athrosclerotic plaque in the cavernous and supraclinoid ICA  Add plavix and continue home aspirin    Plan as above
Complex patient  Cryptogenic Stroke  recommend ILR implant  risks/benefits discussed with patient

## 2023-02-24 NOTE — DISCHARGE NOTE PROVIDER - NSDCCPCAREPLAN_GEN_ALL_CORE_FT
PRINCIPAL DISCHARGE DIAGNOSIS  Diagnosis: Central retinal artery occlusion, left  Assessment and Plan of Treatment: You came tot he hospital for blurry vision you turned out to have a blockage in the artery of the eye. there seems to be a heart issue that caused this small stroke. please take your medications as instructed and followup with the physicians as instructed.       PRINCIPAL DISCHARGE DIAGNOSIS  Diagnosis: Central retinal artery occlusion, left  Assessment and Plan of Treatment: You came tot he hospital for blurry vision you turned out to have a blockage in the artery of the eye. there seems to be a heart issue that caused this small stroke. please take your medications as instructed and followup with the physicians as instructed. a heart monitor was put

## 2023-02-24 NOTE — DISCHARGE NOTE PROVIDER - NSDCFUADDAPPT_GEN_ALL_CORE_FT
Please followup with you primary care doctor in 2 weeks and update on the hospitalization and the recent events. Go over with your primary care doctor over the medications you were discharged on  Please followup with Dr. Silvestre the neurologist in 2 weeks  Please followup with Dr. Victor the eye doctor within 2 weeks  Please follow with Dr. Quintero the Electrophysiology doctor in 2 weeks

## 2023-02-24 NOTE — DISCHARGE NOTE NURSING/CASE MANAGEMENT/SOCIAL WORK - PATIENT PORTAL LINK FT
You can access the FollowMyHealth Patient Portal offered by [facility] by registering at the following website: [email or web address]. By joining [facility]’s FollowMyHealth portal, you will also be able to view your health information using other applications (apps) compatible with our system.

## 2023-02-24 NOTE — DISCHARGE NOTE PROVIDER - HOSPITAL COURSE
HPI:  Pt is a 67 y/o F with DM, HTN, HLD, CKD, chronic diabetic retinopathy of both eyes, who presents with left eye blurred vision x 5 days. Last thursday, pt was home and in the evening she had acute onset blurred vision in her left eye, she decided to go to sleep. The next morning when she woke up the blurred vision was gone but a few hours later it resumed and has stayed blurry ever since. The blurriness is at its worse in the middle/central field of vision, she can make out outlines but cannot read or really tell colors. The right eye is not affected. There is NO pain whatsoever. Pt went to her opthomatologist this morning, he did an exam and was concerned for central retinal artery occlusion so he sent her to the ED for imaging. Denies weakness, numbness, HA, slurred speech.  In the ED, pt given a dose of plavix 75  Vitals: Vital Signs in ED  T(C): 36.1 (21 Feb 2023 13:29), Max: 36.1 (21 Feb 2023 11:48)  T(F): 97 (21 Feb 2023 13:29), Max: 97 (21 Feb 2023 11:48)  HR: 95 (21 Feb 2023 13:29) (95 - 98)  BP: 142/72 (21 Feb 2023 13:29) (142/72 - 169/79)  BP(mean): --  RR: 20 (21 Feb 2023 13:29) (20 - 20)  SpO2: 96% (21 Feb 2023 13:29) (96% - 96%)  Parameters below as of 21 Feb 2023 13:29  Patient On (Oxygen Delivery Method): room air    Labs: remarkable for wbc 16k, crp 18.5    #LEFT EYE BLURRY VISION  - CENTRAL RETINAL ARTERY OCCLUSION?  -continuous PAINLESS blurry vision in left eye for 5 days before presentation   - today patient reports blurry vision, visual fields are normal and EOM are normal .  - Saw her ophthalmologist morning PTP and he was concerned for CRAO based on his eye exam. He did not do any intervention and sent her to ed for stroke w/u   - pt is out of timeline of any potential emergent intervention, though data and studies are anyways unclear about acute treatment for CRAO  - CTH: no acute path, indeterminate lacunar infarcts  - CTA H+N: No evidence of major vascular stenosis or occlusion. Scattered mild atheromatous changes.   - MRI reported MRI brain: Scattered punctate acute infarcts in multiple vascular territories suggestive of a central embolic etiology.  - neuro eval, following added plavix as per neuro recommendations   - plavix and aspirin   - Carotid duplex showed mild atherosclerosis bilaterally   - TTE reported possible PFO  - EMILE showed no PFO and no clot, no cardiac embolic source   - EP Placed loop recorder, contacted today   - LE duplex no DVT   -  discharged from a stroke perspective on Aspirin 81 mg daily and Plavix 75 mg po daily(Plavix should be for only 21 days)-   -  follow up in the stroke clinic in two weeks.     #Leukocytosis, improving   -wbc 16k >> 12.73 resolving , no other sirs   -CRP 18.5  -ESR 45  - no fever monitor for fever spikes   - UA showed glucosuria     #HTN  #HLD: given losartan 50,  amlodipine 5, spirinolactone 12.5, lipitor 20  #DM   #diabetic neuropathy: takes tradjenta, novolog, basaglar, jardiance at home, home dose lantus 38 units, admelog 8u TID, ISS,  gabapentin 300 tid  #Anxiety:  buspirone 7.5

## 2023-02-24 NOTE — DISCHARGE NOTE PROVIDER - NSDCMRMEDTOKEN_GEN_ALL_CORE_FT
Admelog 100 units/mL injectable solution:   amLODIPine 5 mg oral tablet: 1 tab(s) orally once a day  aspirin 81 mg oral tablet, chewable: 1 tab(s) orally once a day  atorvastatin 20 mg oral tablet: 1 tab(s) orally once a day  busPIRone 7.5 mg oral tablet: 1 tab(s) orally 2 times a day  gabapentin 300 mg oral capsule: 1 cap(s) orally 3 times a day  Jardiance 10 mg oral tablet: 1 tab(s) orally once a day (in the morning)  losartan 50 mg oral tablet: 1 tab(s) orally once a day  NovoLOG 100 units/mL injectable solution:   spironolactone 25 mg oral tablet: 0.5 tab(s) orally once a day  Tradjenta 5 mg oral tablet: 1 tab(s) orally once a day   amLODIPine 5 mg oral tablet: 1 tab(s) orally once a day  aspirin 81 mg oral tablet, chewable: 1 tab(s) orally once a day  atorvastatin 20 mg oral tablet: 2 tab(s) orally once a day   Basaglar KwikPen 100 units/mL subcutaneous solution: 38 unit(s) subcutaneous once a day (at bedtime)  busPIRone 7.5 mg oral tablet: 1 tab(s) orally 2 times a day  clopidogrel 75 mg oral tablet: 1 tab(s) orally once a day  gabapentin 300 mg oral capsule: 1 cap(s) orally 3 times a day  Jardiance 10 mg oral tablet: 1 tab(s) orally once a day (in the morning)  losartan 50 mg oral tablet: 1 tab(s) orally once a day  NovoLOG 100 units/mL injectable solution: 8 unit(s) injectable 2 times a day  spironolactone 25 mg oral tablet: 0.5 tab(s) orally once a day  Tradjenta 5 mg oral tablet: 1 tab(s) orally once a day

## 2023-02-24 NOTE — PRE-ANESTHESIA EVALUATION ADULT - NSRADCARDRESULTSFT_GEN_ALL_CORE
1. LV Ejection Fraction by Wilson's Method with a biplane EF of 73 %.   2. Hyperdynamic global left ventricular systolic function.   3. Mild thickening and calcification of the anterior and posterior   mitral valve leaflets.   4. Increased relative wall thickness with normal mass index consistent   with left ventricular concentric remodeling.   5. Mild to moderate mitral annular calcification.   6. Trace mitral valve regurgitation.   7. Sclerotic aortic valve with normal opening.   8. There is mild aortic root calcification.   9. Normal left atrial size.  10. LA volume Index is 20.8 ml/m² ml/m2.  11. Normal right atrial size.  12. Color doppler flow noted at the intra atrial septum suggestive of   PFO. if clinically indicated consider correlation with agitated saline   bubble study.

## 2023-02-24 NOTE — PROGRESS NOTE ADULT - ASSESSMENT
69 YO F w/ a PMH of DM2, HTN, HLD, CKD3, and chronic diabetic retinopathy of both eyes who presents to the hospital w/ a c/o left-sided vision loss that occurred suddenly x 5 days ago. Described as sudden painless, and turned into left-sided blurry vision over the next several days. Denies any pain, temporal pain, or fevers/chills. No palpitations or CP. ROS is negative except as above.   #LEFT EYE BLURRY VISION  - CENTRAL RETINAL ARTERY OCCLUSION?  -continuous PAINLESS blurry vision in left eye for 5 days before presentation   - today patient reports blurry vision, visual fields are normal and EOM are normal .  - Saw her ophthalmologist morning PTP and he was concerned for CRAO based on his eye exam. He did not do any intervention and sent her to ed for stroke w/u   - pt is out of timeline of any potential emergent intervention, though data and studies are anyways unclear about acute treatment for CRAO  - CTH: no acute path, indeterminate lacunar infarcts  - CTA H+N: No evidence of major vascular stenosis or occlusion. Scattered mild atheromatous changes.   - MRI reported MRI brain: Scattered punctate acute infarcts in multiple vascular territories suggestive of a central embolic etiology.  - neuro eval, following added plavix as per neuro recommendations   - cw plavix and aspirin   - Carotid duplex showed mild atherosclerosis bilaterally   - TTE reported possible PFO  - TTE to be done today to assess PFO and any LA thrombus   - EP consulted for loop recorder, tried to reach today   - telemetry showed new acute events   - LE duplex done pending read   -  I workup is negative can be discharged from a stroke perspective on Aspirin 81 mg daily and Plavix 75 mg po daily(Plavix should be for only 21 days)-   - At that time follow up in the stroke clinic in two weeks.     #Leukocytosis, improving   -wbc 16k >> 12.73 resolving , no other sirs   -CRP 18.5  -ESR 45  - no fever monitor for fever spikes   - UA showed glucosuria     #HTN  #HLD  -c/w losartan 50  -c/w amlodipine 5  -c/w spirinolactone 12.5  -c/w lipitor 20    #DM   #diabetic neuropathy  -takes tradjenta, novolog, basaglar, jardiance at home  -c/w home dose lantus 38 units, admelog 8u TID, ISS  -monitor and adjust   -c/w gabapentin 300 tid  - POCT controlled     #Anxiety  -c/w buspirone 7.5    DVT ppx: heparin  Diet: dash  Dispo: from home     follow up: neurology and ophthalmology follow up monitor for  any fever spikes, EMILE by cardio, Loop recorder possible placement by EP      Follow up retina specialist Dr. Zhen Martinez upon discharge 67 YO F w/ a PMH of DM2, HTN, HLD, CKD3, and chronic diabetic retinopathy of both eyes who presents to the hospital w/ a c/o left-sided vision loss that occurred suddenly x 5 days ago. Described as sudden painless, and turned into left-sided blurry vision over the next several days. Denies any pain, temporal pain, or fevers/chills. No palpitations or CP. ROS is negative except as above.   #LEFT EYE BLURRY VISION  - CENTRAL RETINAL ARTERY OCCLUSION?  -continuous PAINLESS blurry vision in left eye for 5 days before presentation   - today patient reports blurry vision, visual fields are normal and EOM are normal .  - Saw her ophthalmologist morning PTP and he was concerned for CRAO based on his eye exam. He did not do any intervention and sent her to ed for stroke w/u   - pt is out of timeline of any potential emergent intervention, though data and studies are anyways unclear about acute treatment for CRAO  - CTH: no acute path, indeterminate lacunar infarcts  - CTA H+N: No evidence of major vascular stenosis or occlusion. Scattered mild atheromatous changes.   - MRI reported MRI brain: Scattered punctate acute infarcts in multiple vascular territories suggestive of a central embolic etiology.  - neuro eval, following added plavix as per neuro recommendations   - cw plavix and aspirin   - Carotid duplex showed mild atherosclerosis bilaterally   - TTE reported possible PFO  - TTE to be done today to assess PFO and any LA thrombus   - EP consulted for loop recorder, contacted today   - telemetry showed new acute events   - LE duplex done pending read   -  If workup is negative can be discharged from a stroke perspective on Aspirin 81 mg daily and Plavix 75 mg po daily(Plavix should be for only 21 days)-   - At that time follow up in the stroke clinic in two weeks.     #Leukocytosis, improving   -wbc 16k >> 12.73 resolving , no other sirs   -CRP 18.5  -ESR 45  - no fever monitor for fever spikes   - UA showed glucosuria     #HTN  #HLD  -c/w losartan 50  -c/w amlodipine 5  -c/w spirinolactone 12.5  -c/w lipitor 20    #DM   #diabetic neuropathy  -takes tradjenta, novolog, basaglar, jardiance at home  -c/w home dose lantus 38 units, admelog 8u TID, ISS  -monitor and adjust   -c/w gabapentin 300 tid  - POCT controlled     #Anxiety  -c/w buspirone 7.5    DVT ppx: heparin  Diet: dash  Dispo: from home     follow up: neurology and ophthalmology follow up monitor for  any fever spikes, EMILE by cardio, Loop recorder possible placement by EP      Follow up retina specialist Dr. Zhen Martinez upon discharge

## 2023-02-24 NOTE — CONSULT NOTE ADULT - ASSESSMENT
Pt is a 69 y/o F with DM, HTN, HLD, CKD, chronic diabetic retinopathy of both eyes, who presents with left eye painless blurred vision x 5 days. Last Thursday 2/16/22, pt was home and in the evening she had acute onset painless blurred vision in her left eye while getting ready for bed. Patient decided to go to bed, and woke up Friday morning and blurred vision was gone, but a few hours later the blurry vision returned and has since persisted. Patient contacted her retina specialist Dr. Zhen Martinez, and was told to follow up in retina clinic on Monday. Come Monday, patient was examined by Dr. Martinez who was concered patient has a CRAO and told patient to go to ED for further work up. Patient denies eye pain, redness, discharge, flashes/floaters/veils, jaw claudication, fevers, weight changes, scalp tenderness. Ophthalmology consulted given CRAO OS.    Writer spoke with Dr. Zhen Martinez, who states when he examined the patient on Monday noted a pale macula and OCT with inner retinal ischemia both suggestive of CRAO and told patient to go to ED for further work up.    POHx: Denies prior surgery, denies lasers, denies injection. Has been told has "mild edema" of the retina OU from diabetes  Gtts: ATs PRN  FHx: Denies blindness, denies glaucoma, denies macular degeneration    BCVA (near card with patient's readers): 20/50 ph 20/30 OD. 20/800 phNI OS.  Pupil: R&r OU, no RAPD OU  IOP: 9/10  EOM: Full OU  CvF: Full OD, unable to asses OS    Bedside penlight exam  External: wnl OU  L/l/a: wnl OU  C/s: W&q OU  K: Clear OU  A/c: Deep OU  Iris: R&r OU  Lens: NS OU  Vitreous: Clear OU    DFE  C/d: 0.30 OU  ONH: S&p OU, no ONH edema OU  Macula: Flat OD. Perifoveal and peripapillary edema w/ red spot at fovea OS  Vessels: Mild tortuosity OU  Periphery: Few IRHs OU    Labs: remarkable for wbc 16k, crp 18.5, ESR 45. Platelets 387 (wnl).    Imaging:  CT Angio Head and neck w/ IV Cont (02.21.23 @ 15:04)   IMPRESSION:  1.  No evidence of major vascular stenosis or occlusion.  2.  Scattered mild atheromatous changes as above.    CT Head No Cont (02.21.23 @ 15:04)  IMPRESSION:  1.  No evidence of acute intracranial hemorrhage or large territory infarct.  2.  Lacunar infarcts in the right basal ganglia and corona radiata of indeterminate age.      1. CRAO OS  - Painless vision loss x 5 days OS. Pale macula and peripapillary areas, with foveal red spot.  - CTH: no acute path, indeterminate lacunar infarcts  -CTA H+N: No evidence of major vascular stenosis or occlusion.Scattered mild atheromatous changes as above.  - Patient denies weight changes, jaw claudication, fevers, scalp tenderness.  WBC elevated. Less likely GCA component given lack of relevant history and ESR slightly elevated when age-adjusted, and CRP less than 20 mg/L. Platelets wnl. Defer further steroid decision to primary neuro team.  - Recommend continued neuro eval  - Recommend carotid US  - Recommend cardiac ECHO  - Recommend close follow up with patient's outside retina provider Dr. Zhen Martinez after discharge,    2. Mild Nonproliferative Diabetic retinopathy OU  - Few intraretinal hemorrhages noted on periphery OU  - Tight glycemic control and follow up with PCP  - Follow up retina specialist Dr. Zhen Martinez upon discharge    Don Cm PGY3  - d/w Dr. Luis Rodriguez
This is a 69 yo F with PMHx of DM, HTN, HLD, CKD, chronic diabetic retinopathy of both eyes, who presents with left eye blurred vision x 5 days. Last thursday, pt was home and in the evening she had acute onset blurred vision in her left eye, she decided to go to sleep. The next morning when she woke up the blurred vision was gone but a few hours later it resumed and has stayed blurry ever since. The blurriness is at its worse in the middle/central field of vision, she can make out outlines but cannot read or really tell colors. She states the blurriness continues at this time, was sent in by her Ophthalmologist Neurology was consulted for evaluation of L eye blurriness, patient's symptoms began 5 days ago. Could be due to retinal artery occlusion but patient is out of window for thrombolysis or intervention.    Plan:  - CT Head reviewed, showed lacunar infarcts in the right basal ganglia and corona radiata of indeterminate age  - CTA H/N reviewed, showed no major stenosis or occlusion  - Recommend MRI Brain w/o, will f/u  - Recommend TTE w/ bubble study  - Continue ASA 81 mg daily  - Continue Atorvastatin, patient is on 20 mg daily at home and can resume  - Recommend to start Plavix 75 mg daily  - Opthalmology eval, appreciate recs  - Lipid profile, A1c, TSH  - Neurology will continue to follow  - Can admit to Medicine
Impression   # Cryptogenic stroke     Recommendations   - Plan for loop recorder today after EMILE   - Continue tele while in house   - Stroke care per neurology team     Discussed with attending.   Signature: Grayson ROMERO, 1st year Cardiology Fellow

## 2023-02-24 NOTE — DISCHARGE NOTE PROVIDER - CARE PROVIDER_API CALL
Connor Quintero)  Cardiac Electrophysiology; Cardiovascular Disease; [facility]  [address] [place] [address]  Phone: [phone]  Fax: [phone]  Follow Up Time: 1 week    Luis Rodriguez)  Ophthalmology  [address] [place] [address]  Phone: [phone]  Fax: [phone]  Follow Up Time: 1 week   Connor Quintero)  Cardiac Electrophysiology; Cardiovascular Disease; [facility]  [address] [place] [address]  Phone: [phone]  Fax: [phone]  Follow Up Time: 1 week    Luis Rodriguez)  Ophthalmology  [address] [place] [address]  Phone: [phone]  Fax: [phone]  Follow Up Time: 1 week    Darwin Contreras)  EEGEpilepsy; Neurology  [address] [facility], [address]  [place] [address]  Phone: [phone]  Fax: [phone]  Follow Up Time: 1 week

## 2023-02-24 NOTE — PROGRESS NOTE ADULT - SUBJECTIVE AND OBJECTIVE BOX
Electrophysiology Brief Post-Op Note    I have personally seen and examined the patient.  I agree with the history and physical which I have reviewed and noted any changes below.  02-24-23 @ 14:21    PRE-OP DIAGNOSIS: Cryptogenic CVA    POST-OP DIAGNOSIS: Cryptogenic CVA    PROCEDURE: Loop Implant    Physician: Dr. Quintero  Assistant: ULISES Keating    ESTIMATED BLOOD LOSS:  2  mL    ANESTHESIA TYPE:  [  ]General Anesthesia  [  ] Sedation  [X  ] Local/Regional    CONDITION  [  ] Critical  [  ] Serious  [  ]Fair  [ X ]Good    SPECIMENS REMOVED (IF APPLICABLE):  none    IMPLANTS (IF APPLICABLE)  Loop Recorder (Medtronic)    FINDINGS  PLAN OF CARE  - F/U 3-4 weeks  - May remove bandaid in 2 days  - May shower in 48 hours

## 2023-02-24 NOTE — DISCHARGE NOTE PROVIDER - NSDCFUSCHEDAPPT_GEN_ALL_CORE_FT
[facility]  NEUROLOGY [address] [place] [address]  Scheduled Appointment: 03/22/2023

## 2023-02-27 PROBLEM — Z00.00 ENCOUNTER FOR PREVENTIVE HEALTH EXAMINATION: Status: ACTIVE | Noted: 2023-02-27

## 2023-03-02 DIAGNOSIS — F41.9 ANXIETY DISORDER, UNSPECIFIED: ICD-10-CM

## 2023-03-02 DIAGNOSIS — N18.30 CHRONIC KIDNEY DISEASE, STAGE 3 UNSPECIFIED: ICD-10-CM

## 2023-03-02 DIAGNOSIS — E11.319 TYPE 2 DIABETES MELLITUS WITH UNSPECIFIED DIABETIC RETINOPATHY WITHOUT MACULAR EDEMA: ICD-10-CM

## 2023-03-02 DIAGNOSIS — H34.12 CENTRAL RETINAL ARTERY OCCLUSION, LEFT EYE: ICD-10-CM

## 2023-03-02 DIAGNOSIS — I12.9 HYPERTENSIVE CHRONIC KIDNEY DISEASE WITH STAGE 1 THROUGH STAGE 4 CHRONIC KIDNEY DISEASE, OR UNSPECIFIED CHRONIC KIDNEY DISEASE: ICD-10-CM

## 2023-03-02 DIAGNOSIS — E11.37X2 TYPE 2 DIABETES MELLITUS WITH DIABETIC MACULAR EDEMA, RESOLVED FOLLOWING TREATMENT, LEFT EYE: ICD-10-CM

## 2023-03-02 DIAGNOSIS — I63.521 CEREBRAL INFARCTION DUE TO UNSPECIFIED OCCLUSION OR STENOSIS OF RIGHT ANTERIOR CEREBRAL ARTERY: ICD-10-CM

## 2023-03-02 DIAGNOSIS — E11.40 TYPE 2 DIABETES MELLITUS WITH DIABETIC NEUROPATHY, UNSPECIFIED: ICD-10-CM

## 2023-03-02 DIAGNOSIS — I16.0 HYPERTENSIVE URGENCY: ICD-10-CM

## 2023-03-06 ENCOUNTER — NON-APPOINTMENT (OUTPATIENT)
Age: 68
End: 2023-03-06

## 2023-03-06 ENCOUNTER — APPOINTMENT (OUTPATIENT)
Dept: NEUROLOGY | Facility: CLINIC | Age: 68
End: 2023-03-06
Payer: MEDICARE

## 2023-03-06 VITALS
HEIGHT: 66 IN | SYSTOLIC BLOOD PRESSURE: 131 MMHG | OXYGEN SATURATION: 96 % | WEIGHT: 230 LBS | DIASTOLIC BLOOD PRESSURE: 75 MMHG | HEART RATE: 91 BPM | BODY MASS INDEX: 36.96 KG/M2 | TEMPERATURE: 97.5 F

## 2023-03-06 PROCEDURE — 99215 OFFICE O/P EST HI 40 MIN: CPT

## 2023-03-07 ENCOUNTER — TRANSCRIPTION ENCOUNTER (OUTPATIENT)
Age: 68
End: 2023-03-07

## 2023-03-13 ENCOUNTER — TRANSCRIPTION ENCOUNTER (OUTPATIENT)
Age: 68
End: 2023-03-13

## 2023-03-14 NOTE — PHYSICAL EXAM
[FreeTextEntry1] : Focal neurological exam:\par \par MS: Awake, alert, oriented to person, place, situation and time. Normal affect. Follows commands. \par \par Language: Speech is clear, fluent with good repetition & comprehension. No dysarthria. \par \par CNs 2 - 12 intact. EOMI no nystagmus, no diplopia. VFF. No facial asymmetry b/l, full eye closure strength b/l. Hearing grossly normal. Head turning & shoulder shrug intact b/l. Tongue midline, normal movements, no atrophy.\par \par Motor: Normal muscle bulk & tone. No noticeable tremor or seizure. No pronator drift. Muscle strength of b/l UE and b/l LE 5/5. \par \par Reflexes: DTR of biceps, knee and ankle normal \par \par Sensation: Intact to LT, temperature and vibration b/l throughout\par \par Cortical: No extinction\par \par Coordination: No dysmetria to FTN.\par \par Gait: No postural instability. Normal stance and tandem gait.\par \par NIHSS 1 (L central vision deficit)\par mRS 1\par \par \par

## 2023-03-14 NOTE — HISTORY OF PRESENT ILLNESS
[FreeTextEntry1] : Pt is a 67 yo RH woman with Hx of DM (dx 5 years ago and A1c was 14), HTN, HLD, CKD, b/l chronic diabetic retinopathy of both eyes, who presented w/ L eye monocular blurry vision in middle/central VF on 2/21/23, sent to hospital by Ophtho. MRI H w/ scattered Punctate infarct in MULTIPLE vascular territories (b/l in posterior and anterior circulation). MILD Chronic microvascular changes and chronic lacunes (R BG, CR). MR orbits NEG. Etiology of stroke thought to be ESUS, now s/p ILR.  CTA H/N w/ R ICA origin MILD stenosis, and calcific plaque mild b/l carotic siphons., V4 also w/ MILD stenosis.  \par \par Denies associated BEFAST sx. L eye blurry vision started before going to bed at night, but she didn't think much of it bc of hx of retinopathy so went to sleep. Woke up in AM and sx resolved in 4-5 hr, and then fogginess came back later that day. Saw ophtho on Tues.  \par \par w/ brother Michael today\par \par TTE wnl (NO PFO, nml LA size, no CHANDRIKA thombus) \par \par Denied associated stroke sx during episode \par \par B/l CUS w/ b/l mild stenosis and b/l LE v US was NEG.  \par \par LDL n/a, A1c 7.1 \par \par Triage /79 \par \par FMHx: Father w/ MI at age 60, brother w/ mini stroke at age 52 w/ heavy smoking hx \par Hx: denies hx of DVT, PE, miscarriages (none in family), never smoking, denies hx of inflammation/infectious dz or previous stroke sx\par \par ESR 45, CRP 18.5 (non age adjusted) BUT GCA r/o bc of lack of other sx like weight changes, jaw claudication, fevers, scalp tenderness and age-adjusted inflamm markers were wnl per ophtho\par -----------------\par Still L eye vision blurry \par In general BP is well controlled PTA and now \par Was told piece of plaque broke off \par PTA: ASA 81 (several years), Atorvastatin 20 \par SHx: HHA\par \par

## 2023-03-14 NOTE — ASSESSMENT
[FreeTextEntry1] : Pt is a 69 yo RH woman with chronic hx of DM and related b/l retinoapthy, and HTN, HLD, CKD, who po 2/21/23 for L CRAO sent by her ophthalmologist. Her sx included L eye blurry vision and she denied any other associated stroke sx. MRI H w/ scattered Punctate infarcts in b/l posterior and anterior circulation. MILD Chronic microvascular changes and chronic lacunes (R BG, CR). Thought to be ESUS, now s/p ILR.  CTA H/N w/ R ICA origin MILD stenosis, and calcific plaque mild b/l carotic siphons., V4 also w/ MILD stenosis. Triage /79. Today, she continues to have L eye central vision deficit, but neuro exam is wnl. \par \par PLAN: \par -Will check with Attending if etiology of plaque thromboembolism and if DAPT needs to be 90 days or we go to monotherapy (Plavix) after 21 days (3/14)  \par -Please notify our office if experiencing spontaneous bleeding or bruising\par -C/w Lipitor 20, repeat lipid panel  \par -F/u ILR  \par -Hypercoag/Rheum w/u \par -ROSAS w/u w/ Pulm\par -Instructed patient to call 911 or report to ED if any acute neurological changes occur, such as having severe, sudden, unusual headache or BEFAST symptoms\par -F/u w/ Dr. Dorantes around 5/21/23

## 2023-03-14 NOTE — ADDENDUM
[FreeTextEntry1] : Spoken to Dr. Dorantes. DAPT x21 days is sufficient per POINT trial. Spoken to patient 15:46 , 03/06/2023\par

## 2023-03-23 ENCOUNTER — APPOINTMENT (OUTPATIENT)
Dept: ELECTROPHYSIOLOGY | Facility: CLINIC | Age: 68
End: 2023-03-23
Payer: MEDICARE

## 2023-03-23 VITALS
HEART RATE: 60 BPM | DIASTOLIC BLOOD PRESSURE: 78 MMHG | BODY MASS INDEX: 37.12 KG/M2 | WEIGHT: 230 LBS | SYSTOLIC BLOOD PRESSURE: 124 MMHG

## 2023-03-23 DIAGNOSIS — Z48.89 ENCOUNTER FOR OTHER SPECIFIED SURGICAL AFTERCARE: ICD-10-CM

## 2023-03-23 PROCEDURE — 93285 PRGRMG DEV EVAL SCRMS IP: CPT

## 2023-03-23 PROCEDURE — 93000 ELECTROCARDIOGRAM COMPLETE: CPT | Mod: 59

## 2023-03-23 RX ORDER — LINAGLIPTIN 5 MG/1
5 TABLET, FILM COATED ORAL
Qty: 30 | Refills: 0 | Status: ACTIVE | COMMUNITY
Start: 2022-06-13

## 2023-03-23 RX ORDER — ATORVASTATIN CALCIUM 20 MG/1
20 TABLET, FILM COATED ORAL
Qty: 30 | Refills: 0 | Status: ACTIVE | COMMUNITY
Start: 2022-06-13

## 2023-03-23 RX ORDER — EMPAGLIFLOZIN 10 MG/1
10 TABLET, FILM COATED ORAL
Qty: 30 | Refills: 0 | Status: ACTIVE | COMMUNITY
Start: 2022-10-28

## 2023-03-23 RX ORDER — INSULIN ASPART 100 [IU]/ML
100 INJECTION, SOLUTION INTRAVENOUS; SUBCUTANEOUS
Qty: 15 | Refills: 0 | Status: ACTIVE | COMMUNITY
Start: 2023-02-10

## 2023-03-23 RX ORDER — PEN NEEDLE, DIABETIC 29 G X1/2"
31G X 8 MM NEEDLE, DISPOSABLE MISCELLANEOUS
Qty: 100 | Refills: 0 | Status: ACTIVE | COMMUNITY
Start: 2023-02-11

## 2023-03-23 RX ORDER — ATORVASTATIN CALCIUM 40 MG/1
40 TABLET, FILM COATED ORAL
Qty: 30 | Refills: 0 | Status: DISCONTINUED | COMMUNITY
Start: 2023-02-24 | End: 2023-03-23

## 2023-03-23 RX ORDER — SODIUM BICARBONATE 325 MG/1
325 TABLET ORAL
Qty: 60 | Refills: 0 | Status: ACTIVE | COMMUNITY
Start: 2022-10-28

## 2023-03-23 RX ORDER — BLOOD SUGAR DIAGNOSTIC
STRIP MISCELLANEOUS
Qty: 100 | Refills: 0 | Status: ACTIVE | COMMUNITY
Start: 2023-01-03

## 2023-03-23 RX ORDER — PREGABALIN 25 MG/1
25 CAPSULE ORAL
Qty: 60 | Refills: 0 | Status: COMPLETED | COMMUNITY
Start: 2023-02-10 | End: 2023-03-23

## 2023-03-23 RX ORDER — ALCOHOL ANTISEPTIC PADS
PADS, MEDICATED (EA) TOPICAL
Qty: 100 | Refills: 0 | Status: ACTIVE | COMMUNITY
Start: 2022-03-01

## 2023-03-23 RX ORDER — INSULIN GLARGINE 100 [IU]/ML
100 INJECTION, SOLUTION SUBCUTANEOUS
Qty: 15 | Refills: 0 | Status: ACTIVE | COMMUNITY
Start: 2022-06-13

## 2023-03-23 RX ORDER — ASPIRIN 81 MG/1
81 TABLET, CHEWABLE ORAL
Qty: 30 | Refills: 0 | Status: DISCONTINUED | COMMUNITY
Start: 2023-02-24 | End: 2023-03-23

## 2023-03-23 RX ORDER — PEN NEEDLE, DIABETIC 29 G X1/2"
31G X 5 MM NEEDLE, DISPOSABLE MISCELLANEOUS
Qty: 100 | Refills: 0 | Status: ACTIVE | COMMUNITY
Start: 2023-02-10

## 2023-03-23 RX ORDER — SODIUM BICARBONATE 650 MG/1
650 TABLET ORAL
Qty: 180 | Refills: 0 | Status: DISCONTINUED | COMMUNITY
Start: 2023-02-12 | End: 2023-03-23

## 2023-03-23 RX ORDER — GABAPENTIN 300 MG/1
300 CAPSULE ORAL
Qty: 90 | Refills: 0 | Status: ACTIVE | COMMUNITY
Start: 2022-10-28

## 2023-03-23 NOTE — PROCEDURE
[No] : not [NSR] : normal sinus rhythm [See Device Printout] : See device printout [Normal] : The battery status is normal. [Sensing Amplitude ___mv] : sensing amplitude was [unfilled] mv [de-identified] : Medtronic [de-identified] : Reveal LINQ II [de-identified] : 02/24/2023 [de-identified] : [id number] [de-identified] : No events

## 2023-03-23 NOTE — CARDIOLOGY SUMMARY
[de-identified] : 3/23/23: sinus rhythm 80 bpm [de-identified] : 2/24/2023:  1. No evidence of cardio-embolic source of CVA.\par  2. Color flow doppler and intravenous injection of agitated saline \par demonstrates the presence of an intact intra atrial septum.\par  3. No left atrial appendage thrombus and normal left atrial appendage \par velocities.\par  4. Hyperdynamic global left ventricular systolic function.\par  5. Normal left atrial size.\par  6. No hemodynamically significant valvular disease.

## 2023-03-23 NOTE — ASSESSMENT
[FreeTextEntry1] : Cryptogenic CVA s/p ILR implant\par - Wound is well healed. There are no signs or symptoms of infection of inflammation. There is no redness, no swelling, no erythema. The patient has no pain. \par - Device interrogation normal. I interrogated and reprogrammed the device as described above. \par - Patient is enrolled in remote monitoring services. I reviewed remote transmission process with the patient, as well as schedule and ability to do manual transmission. We also spoke about associated co-payments with remote monitoring that may not be covered by insurance. \par \par RTO in 1 year and PRN

## 2023-03-23 NOTE — PHYSICAL EXAM
[General Appearance - Well Developed] : well developed [Normal Appearance] : normal appearance [Well Groomed] : well groomed [General Appearance - Well Nourished] : well nourished [No Deformities] : no deformities [General Appearance - In No Acute Distress] : no acute distress [Heart Rate And Rhythm] : heart rate and rhythm were normal [Heart Sounds] : normal S1 and S2 [] : no respiratory distress [Respiration, Rhythm And Depth] : normal respiratory rhythm and effort [Clean] : clean [Dry] : dry [Well-Healed] : well-healed [Abdomen Soft] : soft [Nail Clubbing] : no clubbing of the fingernails [Cyanosis, Localized] : no localized cyanosis [FreeTextEntry1] : Parasternal

## 2023-03-23 NOTE — HISTORY OF PRESENT ILLNESS
[de-identified] : \par EP: Dr. Quintero\par \par 69 y/o F with DM, HTN, HLD, CKD, chronic diabetic retinopathy of both eyes, who went to the ER with left eye blurry vision x 2 days after her ophtho was concerned for central retinal artery occlusion. She had no other symptoms, no weakness, dizziness, headache, changes in speech. \par \par MRI H w/ scattered Punctate infarcts in b/l posterior and anterior circulation. MILD Chronic microvascular changes and chronic lacunes. Maintained on Plavix 75mg per neuro. \par \par She underwent ILR implant for occult AF. She presents for wound check and device interrogation. She has no complaints. Left eye vision is still blurry, was told there is minimal chance to recover vision to this eye.

## 2023-04-07 ENCOUNTER — NON-APPOINTMENT (OUTPATIENT)
Age: 68
End: 2023-04-07

## 2023-04-07 ENCOUNTER — TRANSCRIPTION ENCOUNTER (OUTPATIENT)
Age: 68
End: 2023-04-07

## 2023-04-11 ENCOUNTER — APPOINTMENT (OUTPATIENT)
Dept: ELECTROPHYSIOLOGY | Facility: CLINIC | Age: 68
End: 2023-04-11

## 2023-04-24 ENCOUNTER — APPOINTMENT (OUTPATIENT)
Dept: CARDIOLOGY | Facility: CLINIC | Age: 68
End: 2023-04-24
Payer: MEDICARE

## 2023-04-24 ENCOUNTER — NON-APPOINTMENT (OUTPATIENT)
Age: 68
End: 2023-04-24

## 2023-04-24 PROCEDURE — 93298 REM INTERROG DEV EVAL SCRMS: CPT

## 2023-04-24 PROCEDURE — G2066: CPT

## 2023-05-10 ENCOUNTER — TRANSCRIPTION ENCOUNTER (OUTPATIENT)
Age: 68
End: 2023-05-10

## 2023-05-10 RX ORDER — CLOPIDOGREL BISULFATE 75 MG/1
75 TABLET, FILM COATED ORAL DAILY
Qty: 30 | Refills: 11 | Status: ACTIVE | COMMUNITY
Start: 2023-02-24 | End: 1900-01-01

## 2023-05-26 ENCOUNTER — APPOINTMENT (OUTPATIENT)
Dept: CARDIOLOGY | Facility: CLINIC | Age: 68
End: 2023-05-26
Payer: MEDICARE

## 2023-05-26 ENCOUNTER — NON-APPOINTMENT (OUTPATIENT)
Age: 68
End: 2023-05-26

## 2023-05-26 PROCEDURE — G2066: CPT

## 2023-05-26 PROCEDURE — 93298 REM INTERROG DEV EVAL SCRMS: CPT

## 2023-08-04 ENCOUNTER — APPOINTMENT (OUTPATIENT)
Dept: CARDIOLOGY | Facility: CLINIC | Age: 68
End: 2023-08-04
Payer: MEDICARE

## 2023-08-04 ENCOUNTER — NON-APPOINTMENT (OUTPATIENT)
Age: 68
End: 2023-08-04

## 2023-08-04 PROCEDURE — G2066: CPT

## 2023-08-04 PROCEDURE — 93298 REM INTERROG DEV EVAL SCRMS: CPT

## 2023-09-08 ENCOUNTER — APPOINTMENT (OUTPATIENT)
Dept: CARDIOLOGY | Facility: CLINIC | Age: 68
End: 2023-09-08
Payer: MEDICARE

## 2023-09-08 ENCOUNTER — NON-APPOINTMENT (OUTPATIENT)
Age: 68
End: 2023-09-08

## 2023-09-09 PROCEDURE — G2066: CPT

## 2023-09-09 PROCEDURE — 93298 REM INTERROG DEV EVAL SCRMS: CPT

## 2023-10-13 ENCOUNTER — APPOINTMENT (OUTPATIENT)
Dept: CARDIOLOGY | Facility: CLINIC | Age: 68
End: 2023-10-13
Payer: MEDICARE

## 2023-10-13 ENCOUNTER — NON-APPOINTMENT (OUTPATIENT)
Age: 68
End: 2023-10-13

## 2023-10-14 PROCEDURE — G2066: CPT

## 2023-10-14 PROCEDURE — 93298 REM INTERROG DEV EVAL SCRMS: CPT

## 2023-11-17 ENCOUNTER — NON-APPOINTMENT (OUTPATIENT)
Age: 68
End: 2023-11-17

## 2023-11-17 ENCOUNTER — APPOINTMENT (OUTPATIENT)
Dept: CARDIOLOGY | Facility: CLINIC | Age: 68
End: 2023-11-17
Payer: MEDICARE

## 2023-11-17 PROCEDURE — 93298 REM INTERROG DEV EVAL SCRMS: CPT | Mod: NC

## 2023-11-17 PROCEDURE — G2066: CPT

## 2023-12-22 ENCOUNTER — APPOINTMENT (OUTPATIENT)
Dept: CARDIOLOGY | Facility: CLINIC | Age: 68
End: 2023-12-22
Payer: MEDICARE

## 2023-12-22 ENCOUNTER — NON-APPOINTMENT (OUTPATIENT)
Age: 68
End: 2023-12-22

## 2023-12-23 PROCEDURE — 93298 REM INTERROG DEV EVAL SCRMS: CPT

## 2023-12-23 PROCEDURE — G2066: CPT

## 2024-01-26 ENCOUNTER — APPOINTMENT (OUTPATIENT)
Dept: CARDIOLOGY | Facility: CLINIC | Age: 69
End: 2024-01-26
Payer: MEDICARE

## 2024-01-26 ENCOUNTER — NON-APPOINTMENT (OUTPATIENT)
Age: 69
End: 2024-01-26

## 2024-01-27 PROCEDURE — 93298 REM INTERROG DEV EVAL SCRMS: CPT

## 2024-02-12 ENCOUNTER — APPOINTMENT (OUTPATIENT)
Dept: NEUROLOGY | Facility: CLINIC | Age: 69
End: 2024-02-12

## 2024-02-28 ENCOUNTER — NON-APPOINTMENT (OUTPATIENT)
Age: 69
End: 2024-02-28

## 2024-02-28 ENCOUNTER — APPOINTMENT (OUTPATIENT)
Dept: CARDIOLOGY | Facility: CLINIC | Age: 69
End: 2024-02-28
Payer: MEDICARE

## 2024-02-28 PROCEDURE — 93298 REM INTERROG DEV EVAL SCRMS: CPT

## 2024-03-20 ENCOUNTER — APPOINTMENT (OUTPATIENT)
Dept: ELECTROPHYSIOLOGY | Facility: CLINIC | Age: 69
End: 2024-03-20
Payer: MEDICARE

## 2024-03-20 VITALS
SYSTOLIC BLOOD PRESSURE: 128 MMHG | HEIGHT: 66 IN | TEMPERATURE: 98 F | HEART RATE: 73 BPM | DIASTOLIC BLOOD PRESSURE: 75 MMHG | WEIGHT: 225 LBS | BODY MASS INDEX: 36.16 KG/M2

## 2024-03-20 DIAGNOSIS — Z86.39 PERSONAL HISTORY OF OTHER ENDOCRINE, NUTRITIONAL AND METABOLIC DISEASE: ICD-10-CM

## 2024-03-20 DIAGNOSIS — Z87.448 PERSONAL HISTORY OF OTHER DISEASES OF URINARY SYSTEM: ICD-10-CM

## 2024-03-20 DIAGNOSIS — Z78.9 OTHER SPECIFIED HEALTH STATUS: ICD-10-CM

## 2024-03-20 DIAGNOSIS — I63.9 CEREBRAL INFARCTION, UNSPECIFIED: ICD-10-CM

## 2024-03-20 DIAGNOSIS — Z86.79 PERSONAL HISTORY OF OTHER DISEASES OF THE CIRCULATORY SYSTEM: ICD-10-CM

## 2024-03-20 DIAGNOSIS — Z45.09 ENCOUNTER FOR ADJUSTMENT AND MANAGEMENT OF OTHER CARDIAC DEVICE: ICD-10-CM

## 2024-03-20 PROCEDURE — 93291 INTERROG DEV EVAL SCRMS IP: CPT

## 2024-03-20 PROCEDURE — 99213 OFFICE O/P EST LOW 20 MIN: CPT

## 2024-03-20 RX ORDER — AMLODIPINE BESYLATE 5 MG/1
5 TABLET ORAL
Qty: 30 | Refills: 0 | Status: COMPLETED | COMMUNITY
Start: 2023-02-10 | End: 2024-03-20

## 2024-03-20 RX ORDER — 70%ISOPROPYL ALCOHOL 0.7 ML/ML
70 SWAB TOPICAL
Qty: 100 | Refills: 0 | Status: COMPLETED | COMMUNITY
Start: 2023-02-10 | End: 2024-03-20

## 2024-03-20 RX ORDER — CHOLECALCIFEROL (VITAMIN D3) 50 MCG
50 MCG TABLET ORAL
Qty: 90 | Refills: 0 | Status: COMPLETED | COMMUNITY
Start: 2023-02-10 | End: 2024-03-20

## 2024-03-20 RX ORDER — BUSPIRONE HYDROCHLORIDE 7.5 MG/1
7.5 TABLET ORAL DAILY
Refills: 0 | Status: ACTIVE | COMMUNITY
Start: 2023-02-10

## 2024-03-20 RX ORDER — LOSARTAN POTASSIUM 50 MG/1
50 TABLET, FILM COATED ORAL
Qty: 90 | Refills: 3 | Status: ACTIVE | COMMUNITY
Start: 2023-02-10

## 2024-03-20 RX ORDER — SPIRONOLACTONE 25 MG/1
25 TABLET ORAL DAILY
Qty: 90 | Refills: 3 | Status: ACTIVE | COMMUNITY
Start: 2023-03-04

## 2024-03-20 NOTE — ASSESSMENT
[FreeTextEntry1] : Cryptogenic CVA s/p ILR implant - on Plavix - Device interrogation normal. I interrogated and reprogrammed the device as described above.  - No events - HTN -  BP is well controlled.   RTO in 1 year and PRN

## 2024-03-20 NOTE — PROCEDURE
[No] : not [NSR] : normal sinus rhythm [See Device Printout] : See device printout [Threshold Testing Performed] : Threshold testing was performed [Normal] : The battery status is normal. [Programmed for Longevity] : output reprogrammed for improved battery longevity [Sensing Amplitude ___mv] : sensing amplitude was [unfilled] mv [Counters Reset] : the counters were reset [de-identified] : 75 bpm  [de-identified] : Medtronic [de-identified] : Reveal LINQ II [de-identified] : 02/24/2023 [de-identified] : [id number] [de-identified] : No events

## 2024-03-20 NOTE — CARDIOLOGY SUMMARY
[de-identified] : 3/23/23: sinus rhythm 80 bpm [de-identified] : 2/24/23  [employer]  loop [id number] [de-identified] : 2/24/2023:  1. No evidence of cardio-embolic source of CVA.\par   2. Color flow doppler and intravenous injection of agitated saline \par  demonstrates the presence of an intact intra atrial septum.\par   3. No left atrial appendage thrombus and normal left atrial appendage \par  velocities.\par   4. Hyperdynamic global left ventricular systolic function.\par   5. Normal left atrial size.\par   6. No hemodynamically significant valvular disease.

## 2024-03-20 NOTE — PHYSICAL EXAM
[General Appearance - Well Developed] : well developed [Normal Appearance] : normal appearance [Well Groomed] : well groomed [General Appearance - Well Nourished] : well nourished [No Deformities] : no deformities [General Appearance - In No Acute Distress] : no acute distress [Heart Sounds] : normal S1 and S2 [Heart Rate And Rhythm] : heart rate and rhythm were normal [] : no respiratory distress [Respiration, Rhythm And Depth] : normal respiratory rhythm and effort [Clean] : clean [Dry] : dry [Well-Healed] : well-healed [FreeTextEntry1] : Parasternal [Abdomen Soft] : soft [Cyanosis, Localized] : no localized cyanosis [Nail Clubbing] : no clubbing of the fingernails

## 2024-03-20 NOTE — HISTORY OF PRESENT ILLNESS
[de-identified] : EP: Dr. Quintero  68 y/o F with DM, HTN, HLD, CKD, chronic diabetic retinopathy of both eyes, who went to the ER with left eye blurry vision x 2 days after her ophtho was concerned for central retinal artery occlusion. She had no other symptoms, no weakness, dizziness, headache, changes in speech.   MRI H w/ scattered Punctate infarcts in b/l posterior and anterior circulation. MILD Chronic microvascular changes and chronic lacunes. Maintained on Plavix 75mg per neuro.   She underwent ILR implant for occult AF. presents for routine device check.. She has no complaints. Left eye vision is still blurry.

## 2024-04-01 ENCOUNTER — APPOINTMENT (OUTPATIENT)
Dept: CARDIOLOGY | Facility: CLINIC | Age: 69
End: 2024-04-01
Payer: MEDICARE

## 2024-04-01 VITALS
BODY MASS INDEX: 36.16 KG/M2 | HEIGHT: 66 IN | DIASTOLIC BLOOD PRESSURE: 78 MMHG | WEIGHT: 225 LBS | HEART RATE: 75 BPM | SYSTOLIC BLOOD PRESSURE: 110 MMHG

## 2024-04-01 DIAGNOSIS — M79.604 PAIN IN RIGHT LEG: ICD-10-CM

## 2024-04-01 DIAGNOSIS — M79.605 PAIN IN RIGHT LEG: ICD-10-CM

## 2024-04-01 PROCEDURE — 99204 OFFICE O/P NEW MOD 45 MIN: CPT

## 2024-04-02 NOTE — ASSESSMENT
[FreeTextEntry1] : 70 y/o F with DM, HTN, HLD, CKD, chronic diabetic retinopathy, prior cryptogenic CVA on Plavix ILR - no events     diabetic neuropathy  no classical claudication    Plan:  -RADHA/PVR -LE arterial duplex  -Will communicate result over the phone

## 2024-04-02 NOTE — HISTORY OF PRESENT ILLNESS
[FreeTextEntry1] : 70 y/o F with DM, HTN, HLD, CKD, chronic diabetic retinopathy, prior cryptogenic CVA on Plavix per neuro. Presents for initial visit on recommendation of podiatrist.     ==== Data reviewed today ==== Vital signs   MRI H w/ scattered Punctate infarct in MULTIPLE vascular territories (b/l in posterior and anterior circulation). MILD Chronic microvascular changes and chronic lacunes (R BG, CR). MR orbits NEG. Etiology of stroke thought to be ESUS, now s/p ILR. CTA H/N w/ R ICA origin MILD stenosis, and calcific plaque mild b/l carotic siphons., V4 also w/ MILD stenosis.

## 2024-04-02 NOTE — PHYSICAL EXAM
[General Appearance - Well Developed] : well developed [Normal Oral Mucosa] : normal oral mucosa [Heart Sounds] : normal S1 and S2 [] : no respiratory distress [Bowel Sounds] : normal bowel sounds [Nail Clubbing] : no clubbing of the fingernails [Skin Color & Pigmentation] : normal skin color and pigmentation [Oriented To Time, Place, And Person] : oriented to person, place, and time [1+] : left 1+ [2+] : left 2+

## 2024-04-24 ENCOUNTER — NON-APPOINTMENT (OUTPATIENT)
Age: 69
End: 2024-04-24

## 2024-04-24 ENCOUNTER — APPOINTMENT (OUTPATIENT)
Dept: CARDIOLOGY | Facility: CLINIC | Age: 69
End: 2024-04-24
Payer: MEDICARE

## 2024-04-24 PROCEDURE — 93298 REM INTERROG DEV EVAL SCRMS: CPT

## 2024-04-26 ENCOUNTER — APPOINTMENT (OUTPATIENT)
Dept: CARDIOLOGY | Facility: CLINIC | Age: 69
End: 2024-04-26
Payer: MEDICARE

## 2024-04-26 PROCEDURE — 93925 LOWER EXTREMITY STUDY: CPT

## 2024-04-26 PROCEDURE — 93923 UPR/LXTR ART STDY 3+ LVLS: CPT

## 2024-05-29 ENCOUNTER — NON-APPOINTMENT (OUTPATIENT)
Age: 69
End: 2024-05-29

## 2024-05-29 ENCOUNTER — APPOINTMENT (OUTPATIENT)
Dept: CARDIOLOGY | Facility: CLINIC | Age: 69
End: 2024-05-29
Payer: MEDICARE

## 2024-05-29 PROCEDURE — 93298 REM INTERROG DEV EVAL SCRMS: CPT

## 2024-07-02 ENCOUNTER — NON-APPOINTMENT (OUTPATIENT)
Age: 69
End: 2024-07-02

## 2024-07-02 ENCOUNTER — APPOINTMENT (OUTPATIENT)
Dept: CARDIOLOGY | Facility: CLINIC | Age: 69
End: 2024-07-02
Payer: MEDICARE

## 2024-07-02 PROCEDURE — 93298 REM INTERROG DEV EVAL SCRMS: CPT

## 2024-08-06 ENCOUNTER — APPOINTMENT (OUTPATIENT)
Dept: CARDIOLOGY | Facility: CLINIC | Age: 69
End: 2024-08-06

## 2024-08-06 ENCOUNTER — NON-APPOINTMENT (OUTPATIENT)
Age: 69
End: 2024-08-06

## 2024-08-06 PROCEDURE — 93298 REM INTERROG DEV EVAL SCRMS: CPT

## 2024-09-09 ENCOUNTER — APPOINTMENT (OUTPATIENT)
Dept: CARDIOLOGY | Facility: CLINIC | Age: 69
End: 2024-09-09

## 2024-09-09 ENCOUNTER — NON-APPOINTMENT (OUTPATIENT)
Age: 69
End: 2024-09-09

## 2024-09-09 PROCEDURE — 93298 REM INTERROG DEV EVAL SCRMS: CPT

## 2024-10-01 NOTE — ED ADULT NURSE NOTE - PAIN: PRESENCE, MLM
----- Message from Fabiana Martin MD sent at 9/25/2024 10:14 AM CDT -----  Abnormal Result: Noted to positive nitrite.  Positive UTI.  Recommend patient to start on Macrobid 100 mg twice daily for 7 days.  Please send in prescription accordingly with no refill.  Will adjust antibiotic if needed based on the culture.    The back pain that she had may be related to UTI.  However we are awaiting further results.    Fabiana Martin MD  P [facility] Rn Only [address]  Abnormal Result: Culture showed E. coli.  Sensitivity shows bacteria to be susceptible with the nitrofurantoin.  Please complete Macrobid as given.  
Placed call to pt  Placed call to pt. Relayed results and PCP's recommendations to pt. Pt verbalized understanding and has no further questions.     
denies pain/discomfort (Rating = 0)

## 2024-10-10 ENCOUNTER — APPOINTMENT (OUTPATIENT)
Dept: CARDIOLOGY | Facility: CLINIC | Age: 69
End: 2024-10-10
Payer: MEDICARE

## 2024-10-10 ENCOUNTER — NON-APPOINTMENT (OUTPATIENT)
Age: 69
End: 2024-10-10

## 2024-10-10 PROCEDURE — 93298 REM INTERROG DEV EVAL SCRMS: CPT

## 2024-11-04 ENCOUNTER — APPOINTMENT (OUTPATIENT)
Dept: ENDOCRINOLOGY | Facility: CLINIC | Age: 69
End: 2024-11-04
Payer: MEDICARE

## 2024-11-04 VITALS
DIASTOLIC BLOOD PRESSURE: 76 MMHG | HEART RATE: 72 BPM | SYSTOLIC BLOOD PRESSURE: 118 MMHG | BODY MASS INDEX: 36.32 KG/M2 | RESPIRATION RATE: 18 BRPM | OXYGEN SATURATION: 97 % | WEIGHT: 226 LBS | HEIGHT: 66 IN

## 2024-11-04 DIAGNOSIS — Z63.5 DISRUPTION OF FAMILY BY SEPARATION AND DIVORCE: ICD-10-CM

## 2024-11-04 DIAGNOSIS — Z78.9 OTHER SPECIFIED HEALTH STATUS: ICD-10-CM

## 2024-11-04 DIAGNOSIS — E11.43 TYPE 2 DIABETES MELLITUS WITH DIABETIC AUTONOMIC (POLY)NEUROPATHY: ICD-10-CM

## 2024-11-04 DIAGNOSIS — Z86.59 PERSONAL HISTORY OF OTHER MENTAL AND BEHAVIORAL DISORDERS: ICD-10-CM

## 2024-11-04 DIAGNOSIS — Z87.39 PERSONAL HISTORY OF OTHER DISEASES OF THE MUSCULOSKELETAL SYSTEM AND CONNECTIVE TISSUE: ICD-10-CM

## 2024-11-04 DIAGNOSIS — Z87.448 PERSONAL HISTORY OF OTHER DISEASES OF URINARY SYSTEM: ICD-10-CM

## 2024-11-04 DIAGNOSIS — E66.01 MORBID (SEVERE) OBESITY DUE TO EXCESS CALORIES: ICD-10-CM

## 2024-11-04 DIAGNOSIS — Z83.3 FAMILY HISTORY OF DIABETES MELLITUS: ICD-10-CM

## 2024-11-04 DIAGNOSIS — H54.8 LEGAL BLINDNESS, AS DEFINED IN USA: ICD-10-CM

## 2024-11-04 LAB
GLUCOSE BLDC GLUCOMTR-MCNC: 88
HBA1C MFR BLD HPLC: 5.6

## 2024-11-04 PROCEDURE — 99204 OFFICE O/P NEW MOD 45 MIN: CPT

## 2024-11-04 PROCEDURE — 82962 GLUCOSE BLOOD TEST: CPT

## 2024-11-04 PROCEDURE — 83036 HEMOGLOBIN GLYCOSYLATED A1C: CPT | Mod: QW

## 2024-11-04 RX ORDER — INSULIN GLARGINE 100 [IU]/ML
100 INJECTION, SOLUTION SUBCUTANEOUS AT BEDTIME
Qty: 2 | Refills: 2 | Status: ACTIVE | COMMUNITY
Start: 2024-11-04 | End: 1900-01-01

## 2024-11-04 RX ORDER — TIRZEPATIDE 5 MG/.5ML
5 INJECTION, SOLUTION SUBCUTANEOUS
Qty: 3 | Refills: 2 | Status: ACTIVE | COMMUNITY
Start: 1900-01-01 | End: 1900-01-01

## 2024-11-04 RX ORDER — INSULIN GLARGINE 100 [IU]/ML
100 INJECTION, SOLUTION SUBCUTANEOUS
Refills: 0 | Status: COMPLETED | COMMUNITY
End: 2024-11-04

## 2024-11-04 RX ORDER — INSULIN LISPRO 100 [IU]/ML
100 INJECTION, SOLUTION INTRAVENOUS; SUBCUTANEOUS
Qty: 15 | Refills: 3 | Status: ACTIVE | COMMUNITY

## 2024-11-04 SDOH — SOCIAL STABILITY - SOCIAL INSECURITY: DISRUPTION OF FAMILY BY SEPARATION AND DIVORCE: Z63.5

## 2024-11-13 ENCOUNTER — NON-APPOINTMENT (OUTPATIENT)
Age: 69
End: 2024-11-13

## 2024-11-13 ENCOUNTER — APPOINTMENT (OUTPATIENT)
Dept: CARDIOLOGY | Facility: CLINIC | Age: 69
End: 2024-11-13
Payer: MEDICARE

## 2024-11-13 PROCEDURE — 93298 REM INTERROG DEV EVAL SCRMS: CPT

## 2024-11-14 ENCOUNTER — NON-APPOINTMENT (OUTPATIENT)
Age: 69
End: 2024-11-14

## 2024-11-14 ENCOUNTER — APPOINTMENT (OUTPATIENT)
Dept: CARDIOLOGY | Facility: CLINIC | Age: 69
End: 2024-11-14
Payer: MEDICARE

## 2024-11-14 VITALS
BODY MASS INDEX: 33.43 KG/M2 | HEIGHT: 66 IN | SYSTOLIC BLOOD PRESSURE: 120 MMHG | WEIGHT: 208 LBS | DIASTOLIC BLOOD PRESSURE: 80 MMHG

## 2024-11-14 DIAGNOSIS — I73.9 PERIPHERAL VASCULAR DISEASE, UNSPECIFIED: ICD-10-CM

## 2024-11-14 DIAGNOSIS — E11.65 TYPE 2 DIABETES MELLITUS WITH HYPERGLYCEMIA: ICD-10-CM

## 2024-11-14 DIAGNOSIS — N18.9 CHRONIC KIDNEY DISEASE, UNSPECIFIED: ICD-10-CM

## 2024-11-14 DIAGNOSIS — I63.9 CEREBRAL INFARCTION, UNSPECIFIED: ICD-10-CM

## 2024-11-14 PROCEDURE — 99204 OFFICE O/P NEW MOD 45 MIN: CPT

## 2024-12-18 ENCOUNTER — APPOINTMENT (OUTPATIENT)
Dept: CARDIOLOGY | Facility: CLINIC | Age: 69
End: 2024-12-18
Payer: MEDICARE

## 2024-12-18 ENCOUNTER — NON-APPOINTMENT (OUTPATIENT)
Age: 69
End: 2024-12-18

## 2024-12-18 PROCEDURE — 93298 REM INTERROG DEV EVAL SCRMS: CPT

## 2025-01-22 ENCOUNTER — APPOINTMENT (OUTPATIENT)
Dept: CARDIOLOGY | Facility: CLINIC | Age: 70
End: 2025-01-22
Payer: MEDICARE

## 2025-01-22 ENCOUNTER — NON-APPOINTMENT (OUTPATIENT)
Age: 70
End: 2025-01-22

## 2025-01-22 PROCEDURE — 93298 REM INTERROG DEV EVAL SCRMS: CPT

## 2025-02-24 ENCOUNTER — APPOINTMENT (OUTPATIENT)
Dept: CARDIOLOGY | Facility: CLINIC | Age: 70
End: 2025-02-24
Payer: MEDICARE

## 2025-02-24 ENCOUNTER — NON-APPOINTMENT (OUTPATIENT)
Age: 70
End: 2025-02-24

## 2025-02-24 PROCEDURE — 93298 REM INTERROG DEV EVAL SCRMS: CPT

## 2025-03-06 ENCOUNTER — APPOINTMENT (OUTPATIENT)
Dept: ENDOCRINOLOGY | Facility: CLINIC | Age: 70
End: 2025-03-06
Payer: MEDICARE

## 2025-03-06 VITALS
DIASTOLIC BLOOD PRESSURE: 80 MMHG | SYSTOLIC BLOOD PRESSURE: 120 MMHG | HEIGHT: 66 IN | RESPIRATION RATE: 18 BRPM | HEART RATE: 72 BPM | WEIGHT: 215 LBS | OXYGEN SATURATION: 98 % | BODY MASS INDEX: 34.55 KG/M2

## 2025-03-06 DIAGNOSIS — E11.65 TYPE 2 DIABETES MELLITUS WITH HYPERGLYCEMIA: ICD-10-CM

## 2025-03-06 DIAGNOSIS — I73.9 PERIPHERAL VASCULAR DISEASE, UNSPECIFIED: ICD-10-CM

## 2025-03-06 DIAGNOSIS — E11.43 TYPE 2 DIABETES MELLITUS WITH DIABETIC AUTONOMIC (POLY)NEUROPATHY: ICD-10-CM

## 2025-03-06 DIAGNOSIS — N18.9 CHRONIC KIDNEY DISEASE, UNSPECIFIED: ICD-10-CM

## 2025-03-06 DIAGNOSIS — E66.01 MORBID (SEVERE) OBESITY DUE TO EXCESS CALORIES: ICD-10-CM

## 2025-03-06 PROCEDURE — 99214 OFFICE O/P EST MOD 30 MIN: CPT

## 2025-03-19 ENCOUNTER — NON-APPOINTMENT (OUTPATIENT)
Age: 70
End: 2025-03-19

## 2025-03-19 ENCOUNTER — APPOINTMENT (OUTPATIENT)
Dept: ELECTROPHYSIOLOGY | Facility: CLINIC | Age: 70
End: 2025-03-19
Payer: MEDICARE

## 2025-03-19 VITALS
BODY MASS INDEX: 33.59 KG/M2 | HEART RATE: 81 BPM | WEIGHT: 209 LBS | HEIGHT: 66 IN | DIASTOLIC BLOOD PRESSURE: 66 MMHG | SYSTOLIC BLOOD PRESSURE: 110 MMHG

## 2025-03-19 DIAGNOSIS — Z45.09 ENCOUNTER FOR ADJUSTMENT AND MANAGEMENT OF OTHER CARDIAC DEVICE: ICD-10-CM

## 2025-03-19 DIAGNOSIS — I10 ESSENTIAL (PRIMARY) HYPERTENSION: ICD-10-CM

## 2025-03-19 DIAGNOSIS — I63.9 CEREBRAL INFARCTION, UNSPECIFIED: ICD-10-CM

## 2025-03-19 PROCEDURE — 99213 OFFICE O/P EST LOW 20 MIN: CPT

## 2025-03-19 PROCEDURE — 93000 ELECTROCARDIOGRAM COMPLETE: CPT | Mod: 59

## 2025-03-19 PROCEDURE — 93291 INTERROG DEV EVAL SCRMS IP: CPT

## 2025-03-19 RX ORDER — INSULIN GLARGINE 100 [IU]/ML
100 INJECTION, SOLUTION SUBCUTANEOUS
Refills: 0 | Status: ACTIVE | COMMUNITY

## 2025-04-21 ENCOUNTER — APPOINTMENT (OUTPATIENT)
Dept: CARDIOLOGY | Facility: CLINIC | Age: 70
End: 2025-04-21
Payer: MEDICARE

## 2025-04-21 ENCOUNTER — NON-APPOINTMENT (OUTPATIENT)
Age: 70
End: 2025-04-21

## 2025-04-21 PROCEDURE — 93298 REM INTERROG DEV EVAL SCRMS: CPT

## 2025-05-21 ENCOUNTER — APPOINTMENT (OUTPATIENT)
Dept: CARDIOLOGY | Facility: CLINIC | Age: 70
End: 2025-05-21
Payer: MEDICARE

## 2025-05-21 VITALS
DIASTOLIC BLOOD PRESSURE: 68 MMHG | BODY MASS INDEX: 32.95 KG/M2 | WEIGHT: 205 LBS | SYSTOLIC BLOOD PRESSURE: 110 MMHG | HEIGHT: 66 IN

## 2025-05-21 PROCEDURE — 99214 OFFICE O/P EST MOD 30 MIN: CPT

## 2025-05-21 PROCEDURE — G2211 COMPLEX E/M VISIT ADD ON: CPT

## 2025-05-21 RX ORDER — ROSUVASTATIN CALCIUM 20 MG/1
20 TABLET, FILM COATED ORAL DAILY
Refills: 0 | Status: ACTIVE | COMMUNITY

## 2025-05-23 ENCOUNTER — APPOINTMENT (OUTPATIENT)
Dept: CARDIOLOGY | Facility: CLINIC | Age: 70
End: 2025-05-23

## 2025-05-23 PROCEDURE — 93298 REM INTERROG DEV EVAL SCRMS: CPT

## 2025-06-27 ENCOUNTER — APPOINTMENT (OUTPATIENT)
Dept: CARDIOLOGY | Facility: CLINIC | Age: 70
End: 2025-06-27

## 2025-06-27 PROCEDURE — 93298 REM INTERROG DEV EVAL SCRMS: CPT

## 2025-08-01 ENCOUNTER — APPOINTMENT (OUTPATIENT)
Dept: CARDIOLOGY | Facility: CLINIC | Age: 70
End: 2025-08-01
Payer: MEDICARE

## 2025-08-01 ENCOUNTER — NON-APPOINTMENT (OUTPATIENT)
Age: 70
End: 2025-08-01

## 2025-08-01 PROCEDURE — 93298 REM INTERROG DEV EVAL SCRMS: CPT

## 2025-09-05 ENCOUNTER — APPOINTMENT (OUTPATIENT)
Dept: CARDIOLOGY | Facility: CLINIC | Age: 70
End: 2025-09-05

## 2025-09-05 ENCOUNTER — NON-APPOINTMENT (OUTPATIENT)
Age: 70
End: 2025-09-05

## 2025-09-05 PROCEDURE — 93298 REM INTERROG DEV EVAL SCRMS: CPT
